# Patient Record
Sex: FEMALE | Race: BLACK OR AFRICAN AMERICAN | URBAN - METROPOLITAN AREA
[De-identification: names, ages, dates, MRNs, and addresses within clinical notes are randomized per-mention and may not be internally consistent; named-entity substitution may affect disease eponyms.]

---

## 2020-06-29 ENCOUNTER — WEB ENCOUNTER (OUTPATIENT)
Dept: URBAN - METROPOLITAN AREA CLINIC 105 | Facility: CLINIC | Age: 35
End: 2020-06-29

## 2020-12-02 ENCOUNTER — OFFICE VISIT (OUTPATIENT)
Dept: URBAN - METROPOLITAN AREA CLINIC 105 | Facility: CLINIC | Age: 35
End: 2020-12-02
Payer: COMMERCIAL

## 2020-12-02 DIAGNOSIS — Z12.83 SKIN CANCER SCREENING: ICD-10-CM

## 2020-12-02 DIAGNOSIS — K50.90 CROHN'S DISEASE: ICD-10-CM

## 2020-12-02 DIAGNOSIS — R19.7 DIARRHEA: ICD-10-CM

## 2020-12-02 DIAGNOSIS — D50.9 IRON DEFICIENCY ANEMIA: ICD-10-CM

## 2020-12-02 PROCEDURE — 99214 OFFICE O/P EST MOD 30 MIN: CPT | Performed by: INTERNAL MEDICINE

## 2020-12-02 PROCEDURE — G8427 DOCREV CUR MEDS BY ELIG CLIN: HCPCS | Performed by: INTERNAL MEDICINE

## 2020-12-02 PROCEDURE — G8417 CALC BMI ABV UP PARAM F/U: HCPCS | Performed by: INTERNAL MEDICINE

## 2020-12-02 PROCEDURE — G8483 FLU IMM NO ADMIN DOC REA: HCPCS | Performed by: INTERNAL MEDICINE

## 2020-12-02 PROCEDURE — 1036F TOBACCO NON-USER: CPT | Performed by: INTERNAL MEDICINE

## 2020-12-02 PROCEDURE — G9903 PT SCRN TBCO ID AS NON USER: HCPCS | Performed by: INTERNAL MEDICINE

## 2020-12-02 RX ORDER — PREDNISONE 5 MG/1
1 TABLET TABLET ORAL ONCE A DAY
Status: ACTIVE | COMMUNITY

## 2020-12-02 RX ORDER — PREDNISONE 20 MG/1
2 TABLETS TABLET ORAL ONCE A DAY
Qty: 60 TABLET | Refills: 1 | OUTPATIENT
Start: 2020-12-02 | End: 2021-01-31

## 2020-12-02 RX ORDER — MERCAPTOPURINE 50 MG/1
2 TABS TABLET ORAL DAILY
Qty: 180 TABLET | Refills: 1 | OUTPATIENT
Start: 2020-12-02

## 2020-12-02 NOTE — HPI-TODAY'S VISIT:
The patient presents on follow-up for Crohn's disease.  On 12/20/19, the patient said that in November, she started reporting abdominal pain, nausea/vomiting, and bloody diarrhea. She went to the ER on 11/23. She was diagnosed with the flu and discharged with Zofran and a flu medication. Symptoms persisted. She presented to the ER again on 11/28 with the same symptoms in addition to increased leg/arm pain and decreased mobility. A flu test was not done at the first visit, so it was done this time. It turned out she did not have the flu. Fluid was found in her ear and she was given a diagnosis of an ear infection. A colonoscopy was done on 12/4/19 with Dr. Capps which noted Crohn's colitis.  She was discharged on prednisone 40 mg daily and told her it would decrease the swelling in her legs. She was in the ER again on 12/10-12/14. Her prednisone was increased to 60 mg daily. It was administered intravenously for the first 2 days of her visit and then orally the rest of her stay. She was also given pain medication and Zofran.  Dr. Stephens recommended starting Remicade which she refused as an inpatient.     On 1/15/20, she says she started on 6-MP 50 mg 2 pills QD on 1/2/20. She was on prednisone 20 mg QD and would taper down to 15 mg on Jan 19.  She now reported insomnia and didn't urinate as often. She had 1-2 formed BMs/day with no bleeding. Side pain improved. Nausea resolved. She was on oral iron 1 pill QD.  On 2/18/20, she said she was on her last day of prednisone 2.5 mg. She was taking 6-MP 50 mg 2 pills QD. Diarrhea resolved. Abdominal pain has mostly resolved. She stopped taking oral iron on the 15th.   On 4/1/20, she was doing well. She stopped prednisone at her last visit.  She continued on 6-mp 100 mg daily. She denied abdominal pain, diarrhea, or rectal bleeding.  Today, she says she d/c 6-MP in July at the direction of her PCP (Dr. Aleksandra Bellamy) after presenting for a missed menstrual cycle. She again saw Dr. Bellamy in follow-up and was told to stay off 6-MP. Around this time she was having left-sided pain. An x-ray and MRI were done. She says the imaging found fibroids "pushing up against her kidney," which the patient was told was the cause of her pain. On Dec 18 she will have a uterine ablation. Her other two doctors, Dr. Haley (surgeon treating her fibroids) and Dr. Pinzon (OBGYN, also recommended she stay off 6-MP).  However, given she has been off treatment for Crohn's disease since July 2020 she presents with 4-6 loose BMs/day. She also reports rectal bleeding with each BM. Left-sided pain has persisted.   Labs 4/1/20 - 6-, 6-MMPN 215. CMP normal except creatinine 1.38, GFR 57. CBC, iron/TIBC all normal. 2/3/20 - 6-, 6-MMPN 43762. CBC normal except platelets 486. CMP normal. 1/15/20 - 6-, 6-MMPN 73668.  12/11/19 - CMP normal except sodium 135, ALT 59. 12/10/19 - CBC normal except hgb 9.8, MCV 69.6, platelets 713. 12/4/19 - TPMP normal metabolizer. 12/2/19 - CBC normal except WBC 12.3, hgb 11, MCV 71.4, platelets 620. Sed rate 87, haptoglobin 399, INR 1.42, Iron 15, TIBC 214, iron sat 7%, CRP 38.80. Ferritin normal. 10/4/19 -Mg 1.7. CBC, alk phos all normal. CMP normal except creatinine 1.11.

## 2020-12-03 ENCOUNTER — TELEPHONE ENCOUNTER (OUTPATIENT)
Dept: URBAN - METROPOLITAN AREA CLINIC 92 | Facility: CLINIC | Age: 35
End: 2020-12-03

## 2020-12-03 LAB
A/G RATIO: 1.3
ALBUMIN: 4.4
ALKALINE PHOSPHATASE: 79
ALT (SGPT): 9
AST (SGOT): 16
BASO (ABSOLUTE): 0
BASOS: 1
BILIRUBIN, TOTAL: 0.3
BUN/CREATININE RATIO: 7
BUN: 9
CALCIUM: 9.4
CARBON DIOXIDE, TOTAL: 22
CHLORIDE: 100
CREATININE: 1.27
EGFR IF AFRICN AM: 63
EGFR IF NONAFRICN AM: 55
EOS (ABSOLUTE): 0.2
EOS: 3
FERRITIN, SERUM: 12
GLOBULIN, TOTAL: 3.4
GLUCOSE: 84
HEMATOCRIT: 37.7
HEMATOLOGY COMMENTS:: (no result)
HEMOGLOBIN: 12.2
IMMATURE CELLS: (no result)
IMMATURE GRANS (ABS): 0
IMMATURE GRANULOCYTES: 0
IRON BIND.CAP.(TIBC): 346
IRON SATURATION: 7
IRON: 25
LYMPHS (ABSOLUTE): 1.2
LYMPHS: 17
MCH: 24.5
MCHC: 32.4
MCV: 76
MONOCYTES(ABSOLUTE): 1.3
MONOCYTES: 19
NEUTROPHILS (ABSOLUTE): 4
NEUTROPHILS: 60
NRBC: (no result)
PLATELETS: 387
POTASSIUM: 4
PROTEIN, TOTAL: 7.8
RBC: 4.98
RDW: 14.1
SODIUM: 136
T4,FREE(DIRECT): 1
TSH: 1.54
UIBC: 321
WBC: 6.7

## 2020-12-03 RX ORDER — FERROUS GLUCONATE 324 MG
1 TABLET WITH WATER OR JUICE BETWEEN MEALS TABLET ORAL ONCE A DAY
Qty: 90 | Refills: 1 | OUTPATIENT
Start: 2020-12-03

## 2020-12-15 ENCOUNTER — OFFICE VISIT (OUTPATIENT)
Dept: URBAN - METROPOLITAN AREA CLINIC 105 | Facility: CLINIC | Age: 35
End: 2020-12-15
Payer: COMMERCIAL

## 2020-12-15 DIAGNOSIS — Z12.83 SKIN CANCER SCREENING: ICD-10-CM

## 2020-12-15 DIAGNOSIS — K50.90 CROHN'S DISEASE: ICD-10-CM

## 2020-12-15 DIAGNOSIS — D50.9 IRON DEFICIENCY ANEMIA: ICD-10-CM

## 2020-12-15 PROCEDURE — G8483 FLU IMM NO ADMIN DOC REA: HCPCS | Performed by: INTERNAL MEDICINE

## 2020-12-15 PROCEDURE — G8417 CALC BMI ABV UP PARAM F/U: HCPCS | Performed by: INTERNAL MEDICINE

## 2020-12-15 PROCEDURE — G9903 PT SCRN TBCO ID AS NON USER: HCPCS | Performed by: INTERNAL MEDICINE

## 2020-12-15 PROCEDURE — 1036F TOBACCO NON-USER: CPT | Performed by: INTERNAL MEDICINE

## 2020-12-15 PROCEDURE — G8427 DOCREV CUR MEDS BY ELIG CLIN: HCPCS | Performed by: INTERNAL MEDICINE

## 2020-12-15 PROCEDURE — 99213 OFFICE O/P EST LOW 20 MIN: CPT | Performed by: INTERNAL MEDICINE

## 2020-12-15 RX ORDER — PREDNISONE 20 MG/1
2 TABLETS TABLET ORAL ONCE A DAY
Qty: 60 TABLET | Refills: 1 | Status: ACTIVE | COMMUNITY
Start: 2020-12-02 | End: 2021-01-31

## 2020-12-15 RX ORDER — FERROUS GLUCONATE 324 MG
1 TABLET WITH WATER OR JUICE BETWEEN MEALS TABLET ORAL ONCE A DAY
Qty: 90 | Refills: 1 | Status: ACTIVE | COMMUNITY
Start: 2020-12-03

## 2020-12-15 RX ORDER — PREDNISONE 5 MG/1
1 TABLET TABLET ORAL ONCE A DAY
Status: ACTIVE | COMMUNITY

## 2020-12-15 RX ORDER — MERCAPTOPURINE 50 MG/1
2 TABS TABLET ORAL DAILY
Qty: 180 TABLET | Refills: 1 | Status: ACTIVE | COMMUNITY
Start: 2020-12-02

## 2020-12-15 NOTE — HPI-TODAY'S VISIT:
The patient presents on follow-up for Crohn's disease.  On 12/20/19, the patient said that in November, she started reporting abdominal pain, nausea/vomiting, and bloody diarrhea. She went to the ER on 11/23. She was diagnosed with the flu and discharged with Zofran and a flu medication. Symptoms persisted. She presented to the ER again on 11/28 with the same symptoms in addition to increased leg/arm pain and decreased mobility. A flu test was not done at the first visit, so it was done this time. It turned out she did not have the flu. Fluid was found in her ear and she was given a diagnosis of an ear infection. A colonoscopy was done on 12/4/19 with Dr. Capps which noted Crohn's colitis.  She was discharged on prednisone 40 mg daily and told her it would decrease the swelling in her legs. She was in the ER again on 12/10-12/14. Her prednisone was increased to 60 mg daily. It was administered intravenously for the first 2 days of her visit and then orally the rest of her stay. She was also given pain medication and Zofran.  Dr. Stephens recommended starting Remicade which she refused as an inpatient.     On 1/15/20, she says she started on 6-MP 50 mg 2 pills QD on 1/2/20. She was on prednisone 20 mg QD and would taper down to 15 mg on Jan 19.  She now reported insomnia and didn't urinate as often. She had 1-2 formed BMs/day with no bleeding. Side pain improved. Nausea resolved. She was on oral iron 1 pill QD.  On 2/18/20, she said she was on her last day of prednisone 2.5 mg. She was taking 6-MP 50 mg 2 pills QD. Diarrhea resolved. Abdominal pain has mostly resolved. She stopped taking oral iron on the 15th.   On 4/1/20, she was doing well. She stopped prednisone at her last visit.  She continued on 6-mp 100 mg daily. She denied abdominal pain, diarrhea, or rectal bleeding.  On 12/2/20, she said she d/c 6-MP in July at the direction of her PCP (Dr. Aleksandra Bellamy) after presenting for a missed menstrual cycle. She again saw Dr. Bellamy in follow-up and was told to stay off 6-MP. Around this time she was having left-sided pain. An x-ray and MRI were done. She said the imaging found fibroids "pushing up against her kidney," which the patient was told was the cause of her pain. On Dec 18 she will have a uterine ablation. Her other two doctors, Dr. Haley (surgeon treating her fibroids) and Dr. Pinzon (OBGYN, also recommended she stay off 6-MP).  However, given she had been off treatment for Crohn's disease since July 2020 she presented with 4-6 loose BMs/day. She also reported rectal bleeding with each BM. Left-sided pain had persisted.  Today, she says she started on prednisone (decreased to 35 mg QD on Sunday) and resumed 6-MP. Frequency decreased to 1 BM/day with formation. Bleeding has decreased. She continues to report intermittent cramping before a BM. She has an appetite, but is afraid to liberalize her diet.  She started on iron 1 pill QD.  She is trying to establish with Dr. Ling at [facility].   Labs 12/2/20 - CBC normal except MCV 76. CMP normal creatinine 1.27. Iron 25, iron sat 7%, ferritin 12. TSH/FT4 normal.  4/1/20 - 6-, 6-MMPN 215. CMP normal except creatinine 1.38, GFR 57. CBC, iron/TIBC all normal. 2/3/20 - 6-, 6-MMPN 51198. CBC normal except platelets 486. CMP normal. 1/15/20 - 6-, 6-MMPN 98130.  12/11/19 - CMP normal except sodium 135, ALT 59. 12/10/19 - CBC normal except hgb 9.8, MCV 69.6, platelets 713. 12/4/19 - TPMP normal metabolizer. 12/2/19 - CBC normal except WBC 12.3, hgb 11, MCV 71.4, platelets 620. Sed rate 87, haptoglobin 399, INR 1.42, Iron 15, TIBC 214, iron sat 7%, CRP 38.80. Ferritin normal. 10/4/19 -Mg 1.7. CBC, alk phos all normal. CMP normal except creatinine 1.11.

## 2021-01-27 ENCOUNTER — OFFICE VISIT (OUTPATIENT)
Dept: URBAN - METROPOLITAN AREA CLINIC 105 | Facility: CLINIC | Age: 36
End: 2021-01-27
Payer: COMMERCIAL

## 2021-01-27 DIAGNOSIS — M54.5 LOWER BACK PAIN: ICD-10-CM

## 2021-01-27 DIAGNOSIS — D25.9 UTERINE LEIOMYOMA, UNSPECIFIED LOCATION: ICD-10-CM

## 2021-01-27 DIAGNOSIS — K50.90 CROHN'S DISEASE: ICD-10-CM

## 2021-01-27 DIAGNOSIS — D50.9 IRON DEFICIENCY ANEMIA: ICD-10-CM

## 2021-01-27 DIAGNOSIS — Z12.83 SKIN CANCER SCREENING: ICD-10-CM

## 2021-01-27 PROCEDURE — G8483 FLU IMM NO ADMIN DOC REA: HCPCS | Performed by: INTERNAL MEDICINE

## 2021-01-27 PROCEDURE — 99214 OFFICE O/P EST MOD 30 MIN: CPT | Performed by: INTERNAL MEDICINE

## 2021-01-27 PROCEDURE — G8417 CALC BMI ABV UP PARAM F/U: HCPCS | Performed by: INTERNAL MEDICINE

## 2021-01-27 PROCEDURE — G8427 DOCREV CUR MEDS BY ELIG CLIN: HCPCS | Performed by: INTERNAL MEDICINE

## 2021-01-27 PROCEDURE — G9903 PT SCRN TBCO ID AS NON USER: HCPCS | Performed by: INTERNAL MEDICINE

## 2021-01-27 RX ORDER — PREDNISONE 20 MG/1
2 TABLETS TABLET ORAL ONCE A DAY
Qty: 60 TABLET | Refills: 1 | Status: ON HOLD | COMMUNITY
Start: 2020-12-02 | End: 2021-01-31

## 2021-01-27 RX ORDER — FERROUS GLUCONATE 324 MG
1 TABLET WITH WATER OR JUICE BETWEEN MEALS TABLET ORAL ONCE A DAY
Qty: 90 | Refills: 1 | Status: ON HOLD | COMMUNITY
Start: 2020-12-03

## 2021-01-27 RX ORDER — PREDNISONE 5 MG/1
1 TABLET TABLET ORAL ONCE A DAY
Status: ON HOLD | COMMUNITY

## 2021-01-27 RX ORDER — MERCAPTOPURINE 50 MG/1
2 TABS TABLET ORAL DAILY
Qty: 180 TABLET | Refills: 1 | Status: ACTIVE | COMMUNITY
Start: 2020-12-02

## 2021-01-27 NOTE — HPI-TODAY'S VISIT:
The patient presents on follow-up for Crohn's disease.  On 12/20/19, the patient said that in November, she started reporting abdominal pain, nausea/vomiting, and bloody diarrhea. She went to the ER on 11/23. She was diagnosed with the flu and discharged with Zofran and a flu medication. Symptoms persisted. She presented to the ER again on 11/28 with the same symptoms in addition to increased leg/arm pain and decreased mobility. A flu test was not done at the first visit, so it was done this time. It turned out she did not have the flu. Fluid was found in her ear and she was given a diagnosis of an ear infection. A colonoscopy was done on 12/4/19 with Dr. Capps which noted Crohn's colitis.  She was discharged on prednisone 40 mg daily and told her it would decrease the swelling in her legs. She was in the ER again on 12/10-12/14. Her prednisone was increased to 60 mg daily. It was administered intravenously for the first 2 days of her visit and then orally the rest of her stay. She was also given pain medication and Zofran.  Dr. Stephens recommended starting Remicade which she refused as an inpatient.     On 1/15/20, she says she started on 6-MP 50 mg 2 pills QD on 1/2/20. She was on prednisone 20 mg QD and would taper down to 15 mg on Jan 19.  She now reported insomnia and didn't urinate as often. She had 1-2 formed BMs/day with no bleeding. Side pain improved. Nausea resolved. She was on oral iron 1 pill QD.  On 2/18/20, she said she was on her last day of prednisone 2.5 mg. She was taking 6-MP 50 mg 2 pills QD. Diarrhea resolved. Abdominal pain has mostly resolved. She stopped taking oral iron on the 15th.   On 4/1/20, she was doing well. She stopped prednisone at her last visit.  She continued on 6-mp 100 mg daily. She denied abdominal pain, diarrhea, or rectal bleeding.  On 12/2/20, she said she d/c 6-MP in July at the direction of her PCP (Dr. Aleksandra Bellamy) after presenting for a missed menstrual cycle. She again saw Dr. Bellamy in follow-up and was told to stay off 6-MP. Around this time she was having left-sided pain. An x-ray and MRI were done. She said the imaging found fibroids "pushing up against her kidney," which the patient was told was the cause of her pain. On Dec 18 she will have a uterine ablation. Her other two doctors, Dr. Haley (surgeon treating her fibroids) and Dr. Pinzon (OBGYN, also recommended she stay off 6-MP).  However, given she had been off treatment for Crohn's disease since July 2020 she presented with 4-6 loose BMs/day. She also reported rectal bleeding with each BM. Left-sided pain had persisted.  On 12/15/20, she said she started on prednisone (decreased to 35 mg QD on Sunday) and resumed 6-MP. Frequency decreased to 1 BM/day with formation. Bleeding had decreased. She continued to report intermittent cramping before a BM. She had an appetite, but was afraid to liberalize her diet.  She started on iron 1 pill QD.  Today, she says she tapered off prednisone on Sunday. She reports onset of bilateral lower back pain in the last week in addition to fatigue. She continues on 6-MP 50 mg 2 pills QD. She is off iron. She has liberalized her diet more since last visit, but is still on a low-fiber diet.  Labs 12/2/20 - CBC normal except MCV 76. CMP normal creatinine 1.27. Iron 25, iron sat 7%, ferritin 12. TSH/FT4 normal.  4/1/20 - 6-, 6-MMPN 215. CMP normal except creatinine 1.38, GFR 57. CBC, iron/TIBC all normal. 2/3/20 - 6-, 6-MMPN 71623. CBC normal except platelets 486. CMP normal. 1/15/20 - 6-, 6-MMPN 48992.  12/11/19 - CMP normal except sodium 135, ALT 59. 12/10/19 - CBC normal except hgb 9.8, MCV 69.6, platelets 713. 12/4/19 - TPMP normal metabolizer. 12/2/19 - CBC normal except WBC 12.3, hgb 11, MCV 71.4, platelets 620. Sed rate 87, haptoglobin 399, INR 1.42, Iron 15, TIBC 214, iron sat 7%, CRP 38.80. Ferritin normal. 10/4/19 -Mg 1.7. CBC, alk phos all normal. CMP normal except creatinine 1.11.

## 2021-02-02 ENCOUNTER — TELEPHONE ENCOUNTER (OUTPATIENT)
Dept: URBAN - METROPOLITAN AREA CLINIC 92 | Facility: CLINIC | Age: 36
End: 2021-02-02

## 2021-02-02 PROBLEM — 34000006: Status: ACTIVE | Noted: 2021-02-02

## 2021-02-02 LAB
6-MMPN: (no result)
6-TGN: 408
A/G RATIO: 1.8
ALBUMIN: 4.3
ALKALINE PHOSPHATASE: 101
ALT (SGPT): 118
APPEARANCE: CLEAR
AST (SGOT): 43
BASO (ABSOLUTE): 0
BASOS: 0
BILIRUBIN, TOTAL: 0.6
BILIRUBIN: NEGATIVE
BUN/CREATININE RATIO: 7
BUN: 6
CALCIUM: 8.9
CARBON DIOXIDE, TOTAL: 21
CHLORIDE: 105
CREATININE: 0.82
EGFR IF AFRICN AM: 107
EGFR IF NONAFRICN AM: 93
EOS (ABSOLUTE): 0.1
EOS: 4
FERRITIN, SERUM: 53
GLOBULIN, TOTAL: 2.4
GLUCOSE: 94
GLUCOSE: NEGATIVE
HEMATOCRIT: 29.8
HEMATOLOGY COMMENTS:: (no result)
HEMOGLOBIN: 10
IMMATURE CELLS: (no result)
IMMATURE GRANS (ABS): 0
IMMATURE GRANULOCYTES: 0
IRON BIND.CAP.(TIBC): 258
IRON SATURATION: 36
IRON: 93
KETONES: NEGATIVE
LYMPHS (ABSOLUTE): 0.8
LYMPHS: 32
Lab: (no result)
MCH: 28.3
MCHC: 33.6
MCV: 84
MICROSCOPIC EXAMINATION: (no result)
MONOCYTES(ABSOLUTE): 0.1
MONOCYTES: 5
NEUTROPHILS (ABSOLUTE): 1.5
NEUTROPHILS: 59
NITRITE, URINE: NEGATIVE
NRBC: (no result)
OCCULT BLOOD: NEGATIVE
PH: 6.5
PLATELETS: 268
POTASSIUM: 3.8
PROTEIN, TOTAL: 6.7
PROTEIN: NEGATIVE
RBC: 3.53
RDW: 22.6
SODIUM: 138
SPECIFIC GRAVITY: 1.01
UIBC: 165
URINE CULTURE, ROUTINE: (no result)
URINE-COLOR: YELLOW
UROBILINOGEN,SEMI-QN: 0.2
WBC ESTERASE: NEGATIVE
WBC: 2.6

## 2021-04-28 ENCOUNTER — OFFICE VISIT (OUTPATIENT)
Dept: URBAN - METROPOLITAN AREA CLINIC 105 | Facility: CLINIC | Age: 36
End: 2021-04-28
Payer: COMMERCIAL

## 2021-04-28 VITALS
HEIGHT: 71 IN | HEART RATE: 77 BPM | DIASTOLIC BLOOD PRESSURE: 71 MMHG | BODY MASS INDEX: 27.44 KG/M2 | WEIGHT: 196 LBS | SYSTOLIC BLOOD PRESSURE: 111 MMHG | TEMPERATURE: 97.9 F

## 2021-04-28 DIAGNOSIS — Z12.83 SKIN CANCER SCREENING: ICD-10-CM

## 2021-04-28 DIAGNOSIS — K50.90 CROHN'S DISEASE: ICD-10-CM

## 2021-04-28 DIAGNOSIS — D64.9 NORMOCYTIC ANEMIA: ICD-10-CM

## 2021-04-28 DIAGNOSIS — D50.9 IRON DEFICIENCY ANEMIA: ICD-10-CM

## 2021-04-28 DIAGNOSIS — D25.9 UTERINE LEIOMYOMA, UNSPECIFIED LOCATION: ICD-10-CM

## 2021-04-28 DIAGNOSIS — M54.5 LOWER BACK PAIN: ICD-10-CM

## 2021-04-28 PROCEDURE — 99214 OFFICE O/P EST MOD 30 MIN: CPT | Performed by: INTERNAL MEDICINE

## 2021-04-28 RX ORDER — MERCAPTOPURINE 50 MG/1
2 TABS TABLET ORAL DAILY
Qty: 180 TABLET | Refills: 1 | Status: ACTIVE | COMMUNITY
Start: 2020-12-02

## 2021-04-28 RX ORDER — PREDNISONE 5 MG/1
1 TABLET TABLET ORAL ONCE A DAY
Status: ON HOLD | COMMUNITY

## 2021-04-28 RX ORDER — FERROUS GLUCONATE 324 MG
1 TABLET WITH WATER OR JUICE BETWEEN MEALS TABLET ORAL ONCE A DAY
Qty: 90 | Refills: 1 | Status: ON HOLD | COMMUNITY
Start: 2020-12-03

## 2021-04-28 NOTE — HPI-TODAY'S VISIT:
The patient presents on follow-up for Crohn's disease.  On 12/20/19, the patient said that in November, she started reporting abdominal pain, nausea/vomiting, and bloody diarrhea. She went to the ER on 11/23. She was diagnosed with the flu and discharged with Zofran and a flu medication. Symptoms persisted. She presented to the ER again on 11/28 with the same symptoms in addition to increased leg/arm pain and decreased mobility. A flu test was not done at the first visit, so it was done this time. It turned out she did not have the flu. Fluid was found in her ear and she was given a diagnosis of an ear infection. A colonoscopy was done on 12/4/19 with Dr. Capps which noted Crohn's colitis.  She was discharged on prednisone 40 mg daily and told her it would decrease the swelling in her legs. She was in the ER again on 12/10-12/14. Her prednisone was increased to 60 mg daily. It was administered intravenously for the first 2 days of her visit and then orally the rest of her stay. She was also given pain medication and Zofran.  Dr. Stephens recommended starting Remicade which she refused as an inpatient.     On 1/15/20, she says she started on 6-MP 50 mg 2 pills QD on 1/2/20. She was on prednisone 20 mg QD and would taper down to 15 mg on Jan 19.  She now reported insomnia and didn't urinate as often. She had 1-2 formed BMs/day with no bleeding. Side pain improved. Nausea resolved. She was on oral iron 1 pill QD.  On 2/18/20, she said she was on her last day of prednisone 2.5 mg. She was taking 6-MP 50 mg 2 pills QD. Diarrhea resolved. Abdominal pain has mostly resolved. She stopped taking oral iron on the 15th.   On 4/1/20, she was doing well. She stopped prednisone at her last visit.  She continued on 6-mp 100 mg daily. She denied abdominal pain, diarrhea, or rectal bleeding.  On 12/2/20, she said she d/c 6-MP in July at the direction of her PCP (Dr. Aleksandra Bellamy) after presenting for a missed menstrual cycle. She again saw Dr. Bellamy in follow-up and was told to stay off 6-MP. Around this time she was having left-sided pain. An x-ray and MRI were done. She said the imaging found fibroids "pushing up against her kidney," which the patient was told was the cause of her pain. On Dec 18 she will have a uterine ablation. Her other two doctors, Dr. Haley (surgeon treating her fibroids) and Dr. Pinzon (OBGYN, also recommended she stay off 6-MP).  However, given she had been off treatment for Crohn's disease since July 2020 she presented with 4-6 loose BMs/day. She also reported rectal bleeding with each BM. Left-sided pain had persisted.  On 12/15/20, she said she started on prednisone (decreased to 35 mg QD on Sunday) and resumed 6-MP. Frequency decreased to 1 BM/day with formation. Bleeding had decreased. She continued to report intermittent cramping before a BM. She had an appetite, but was afraid to liberalize her diet.  She started on iron 1 pill QD.  On 1/27/21, she said she tapered off prednisone on Sunday. She reported onset of bilateral lower back pain in the last week in addition to fatigue. She continued on 6-MP 50 mg 2 pills QD. She was off iron. She had liberalized her diet more since last visit, but was still on a low-fiber diet.  Today, she says she decreased to 6-MP 50 mg 1 pill QD. She is currently asymptomatic GI-wise. She had her uterine fibroid embolization on March 16 and had labs done 2 weeks prior to that. She had temporary constipation following her procedure. Lower back pain has resolved.   Labs 1/27/21 - 6-, 6-MMPN 27740. CBC normal except WBC 2.6, hgb 10. CMP normal except AST 43, . UA, urine culture all negative. Iron/TIBC, ferritin all normal. 12/2/20 - CBC normal except MCV 76. CMP normal creatinine 1.27. Iron 25, iron sat 7%, ferritin 12. TSH/FT4 normal.  4/1/20 - 6-, 6-MMPN 215. CMP normal except creatinine 1.38, GFR 57. CBC, iron/TIBC all normal. 2/3/20 - 6-, 6-MMPN 31529. CBC normal except platelets 486. CMP normal. 1/15/20 - 6-, 6-MMPN 41573.  12/11/19 - CMP normal except sodium 135, ALT 59. 12/10/19 - CBC normal except hgb 9.8, MCV 69.6, platelets 713. 12/4/19 - TPMP normal metabolizer. 12/2/19 - CBC normal except WBC 12.3, hgb 11, MCV 71.4, platelets 620. Sed rate 87, haptoglobin 399, INR 1.42, Iron 15, TIBC 214, iron sat 7%, CRP 38.80. Ferritin normal. 10/4/19 -Mg 1.7. CBC, alk phos all normal. CMP normal except creatinine 1.11.

## 2021-05-04 LAB
6-MMPN: 4912
6-TGN: 340
A/G RATIO: 1.7
ALBUMIN: 4.4
ALKALINE PHOSPHATASE: 74
ALPHA THAL REFLEX: (no result)
ALT (SGPT): 16
AST (SGOT): 20
BASO (ABSOLUTE): 0
BASOS: 1
BILIRUBIN, TOTAL: <0.2
BUN/CREATININE RATIO: 10
BUN: 9
CALCIUM: 9.1
CARBON DIOXIDE, TOTAL: 21
CHLORIDE: 105
CREATININE: 0.92
EGFR IF AFRICN AM: 93
EGFR IF NONAFRICN AM: 80
EOS (ABSOLUTE): 0.1
EOS: 2
FERRITIN, SERUM: 21
FOLATE (FOLIC ACID), SERUM: 10.9
GLOBULIN, TOTAL: 2.6
GLUCOSE: 87
HEMATOCRIT: 34.2
HEMATOLOGY COMMENTS:: (no result)
HEMOGLOBIN: 11.1
HGB A2: (no result)
HGB A2: 3
HGB A: (no result)
HGB A: 58.9
HGB C: 35.3
HGB F: (no result)
HGB F: 2.8
HGB S: (no result)
HGB S: 0
HGB VARIANT: 0
IMMATURE CELLS: (no result)
IMMATURE GRANS (ABS): 0
IMMATURE GRANULOCYTES: 0
INTERPRETATION:: (no result)
IRON BIND.CAP.(TIBC): 335
IRON SATURATION: 9
IRON: 30
LYMPHS (ABSOLUTE): 0.8
LYMPHS: 16
Lab: (no result)
Lab: 0
MCH: 28.5
MCHC: 32.5
MCV: 88
MONOCYTES(ABSOLUTE): 0.4
MONOCYTES: 7
NEUTROPHILS (ABSOLUTE): 3.9
NEUTROPHILS: 74
NRBC: (no result)
PLATELETS: 370
POTASSIUM: 4
PROTEIN, TOTAL: 7
RBC: 3.9
RDW: 14.2
SODIUM: 137
UIBC: 305
VITAMIN B12: 647
WBC: 5.3

## 2021-08-03 ENCOUNTER — OFFICE VISIT (OUTPATIENT)
Dept: URBAN - METROPOLITAN AREA CLINIC 105 | Facility: CLINIC | Age: 36
End: 2021-08-03
Payer: COMMERCIAL

## 2021-08-03 VITALS
WEIGHT: 208.6 LBS | SYSTOLIC BLOOD PRESSURE: 123 MMHG | HEIGHT: 71 IN | HEART RATE: 86 BPM | DIASTOLIC BLOOD PRESSURE: 85 MMHG | TEMPERATURE: 96.7 F | BODY MASS INDEX: 29.2 KG/M2

## 2021-08-03 DIAGNOSIS — Z12.83 SKIN CANCER SCREENING: ICD-10-CM

## 2021-08-03 DIAGNOSIS — Z14.8 HEMOGLOBIN C VARIANT CARRIER: ICD-10-CM

## 2021-08-03 DIAGNOSIS — D64.9 NORMOCYTIC ANEMIA: ICD-10-CM

## 2021-08-03 DIAGNOSIS — E55.9 VITAMIN D DEFICIENCY: ICD-10-CM

## 2021-08-03 DIAGNOSIS — D25.9 UTERINE LEIOMYOMA, UNSPECIFIED LOCATION: ICD-10-CM

## 2021-08-03 DIAGNOSIS — K50.90 CROHN'S DISEASE: ICD-10-CM

## 2021-08-03 DIAGNOSIS — D50.9 IRON DEFICIENCY ANEMIA: ICD-10-CM

## 2021-08-03 DIAGNOSIS — M54.5 LOWER BACK PAIN: ICD-10-CM

## 2021-08-03 PROCEDURE — 99214 OFFICE O/P EST MOD 30 MIN: CPT | Performed by: INTERNAL MEDICINE

## 2021-08-03 RX ORDER — FERROUS GLUCONATE 324 MG
1 TABLET WITH WATER OR JUICE BETWEEN MEALS TABLET ORAL ONCE A DAY
Qty: 90 | Refills: 1 | Status: ON HOLD | COMMUNITY
Start: 2020-12-03

## 2021-08-03 RX ORDER — CHOLECALCIFEROL (VITAMIN D3) 50 MCG
1 TABLET TABLET ORAL ONCE A DAY
Status: ACTIVE | COMMUNITY

## 2021-08-03 RX ORDER — PREDNISONE 5 MG/1
1 TABLET TABLET ORAL ONCE A DAY
Status: ON HOLD | COMMUNITY

## 2021-08-03 RX ORDER — MERCAPTOPURINE 50 MG/1
2 TABS TABLET ORAL DAILY
Qty: 180 TABLET | Refills: 1 | Status: ACTIVE | COMMUNITY
Start: 2020-12-02

## 2021-08-03 NOTE — HPI-TODAY'S VISIT:
The patient presents on follow-up for Crohn's disease.  On 12/20/19, the patient said that in November, she started reporting abdominal pain, nausea/vomiting, and bloody diarrhea. She went to the ER on 11/23. She was diagnosed with the flu and discharged with Zofran and a flu medication. Symptoms persisted. She presented to the ER again on 11/28 with the same symptoms in addition to increased leg/arm pain and decreased mobility. A flu test was not done at the first visit, so it was done this time. It turned out she did not have the flu. Fluid was found in her ear and she was given a diagnosis of an ear infection. A colonoscopy was done on 12/4/19 with Dr. Capps which noted Crohn's colitis.  She was discharged on prednisone 40 mg daily and told her it would decrease the swelling in her legs. She was in the ER again on 12/10-12/14. Her prednisone was increased to 60 mg daily. It was administered intravenously for the first 2 days of her visit and then orally the rest of her stay. She was also given pain medication and Zofran.  Dr. Stephens recommended starting Remicade which she refused as an inpatient.     On 1/15/20, she says she started on 6-MP 50 mg 2 pills QD on 1/2/20. She was on prednisone 20 mg QD and would taper down to 15 mg on Jan 19.  She now reported insomnia and didn't urinate as often. She had 1-2 formed BMs/day with no bleeding. Side pain improved. Nausea resolved. She was on oral iron 1 pill QD.  On 2/18/20, she said she was on her last day of prednisone 2.5 mg. She was taking 6-MP 50 mg 2 pills QD. Diarrhea resolved. Abdominal pain has mostly resolved. She stopped taking oral iron on the 15th.   On 4/1/20, she was doing well. She stopped prednisone at her last visit.  She continued on 6-mp 100 mg daily. She denied abdominal pain, diarrhea, or rectal bleeding.  On 12/2/20, she said she d/c 6-MP in July at the direction of her PCP (Dr. Aleksandra Bellamy) after presenting for a missed menstrual cycle. She again saw Dr. Bellamy in follow-up and was told to stay off 6-MP. Around this time she was having left-sided pain. An x-ray and MRI were done. She said the imaging found fibroids "pushing up against her kidney," which the patient was told was the cause of her pain. On Dec 18 she will have a uterine ablation. Her other two doctors, Dr. Haley (surgeon treating her fibroids) and Dr. Pinzon (OBGYN, also recommended she stay off 6-MP).  However, given she had been off treatment for Crohn's disease since July 2020 she presented with 4-6 loose BMs/day. She also reported rectal bleeding with each BM. Left-sided pain had persisted.  On 12/15/20, she said she started on prednisone (decreased to 35 mg QD on Sunday) and resumed 6-MP. Frequency decreased to 1 BM/day with formation. Bleeding had decreased. She continued to report intermittent cramping before a BM. She had an appetite, but was afraid to liberalize her diet.  She started on iron 1 pill QD.  On 1/27/21, she said she tapered off prednisone on Sunday. She reported onset of bilateral lower back pain in the last week in addition to fatigue. She continued on 6-MP 50 mg 2 pills QD. She was off iron. She had liberalized her diet more since last visit, but was still on a low-fiber diet.  On 4/28/21, she said she decreased to 6-MP 50 mg 1 pill QD. She was currently asymptomatic GI-wise. She had her uterine fibroid embolization on March 16 and had labs done 2 weeks prior to that. She had temporary constipation following her procedure. Lower back pain had resolved.  Today, she reports onset of left-sided abdominal pain and lower back discomfort 3 weeks ago. As a result, she sleeps on a pillow  on both hips which provides some benefit. Having a BM also lessens discomfort. She has not seen a rheumatologist. 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 She is trying meditation in the morning to relieve stress and counts down from 100 when stressed. She has not had a recurrence of diarrhea or rectal bleeding. She continues on 6-MP 50 mg 1 pill QD. She continues on oral iron which has decreased her BM frequency to QOD she states. She has started on vit D supplementation 1x/week since early June.    Labs 4/28/21 - 6-, 6-MMPN 4912. Iron 9, hgb electrophoresis - hgb C trait (heterozygous), CMP, vit B12/folate all normal. 1/27/21 - 6-, 6-MMPN 98732. CBC normal except WBC 2.6, hgb 10. CMP normal except AST 43, . UA, urine culture all negative. Iron/TIBC, ferritin all normal. 12/2/20 - CBC normal except MCV 76. CMP normal creatinine 1.27. Iron 25, iron sat 7%, ferritin 12. TSH/FT4 normal.  4/1/20 - 6-, 6-MMPN 215. CMP normal except creatinine 1.38, GFR 57. CBC, iron/TIBC all normal. 2/3/20 - 6-, 6-MMPN 06855. CBC normal except platelets 486. CMP normal. 1/15/20 - 6-, 6-MMPN 48417.  12/11/19 - CMP normal except sodium 135, ALT 59. 12/10/19 - CBC normal except hgb 9.8, MCV 69.6, platelets 713. 12/4/19 - TPMP normal metabolizer. 12/2/19 - CBC normal except WBC 12.3, hgb 11, MCV 71.4, platelets 620. Sed rate 87, haptoglobin 399, INR 1.42, Iron 15, TIBC 214, iron sat 7%, CRP 38.80. Ferritin normal. 10/4/19 -Mg 1.7. CBC, alk phos all normal. CMP normal except creatinine 1.11.

## 2021-09-14 ENCOUNTER — TELEPHONE ENCOUNTER (OUTPATIENT)
Dept: URBAN - METROPOLITAN AREA CLINIC 105 | Facility: CLINIC | Age: 36
End: 2021-09-14

## 2021-09-14 RX ORDER — MERCAPTOPURINE 50 MG/1
1 TAB TABLET ORAL DAILY
Qty: 90 | Refills: 1
Start: 2020-12-02

## 2021-11-02 ENCOUNTER — TELEPHONE ENCOUNTER (OUTPATIENT)
Dept: URBAN - METROPOLITAN AREA CLINIC 105 | Facility: CLINIC | Age: 36
End: 2021-11-02

## 2021-11-09 ENCOUNTER — OFFICE VISIT (OUTPATIENT)
Dept: URBAN - METROPOLITAN AREA CLINIC 105 | Facility: CLINIC | Age: 36
End: 2021-11-09
Payer: COMMERCIAL

## 2021-11-09 VITALS
WEIGHT: 207.4 LBS | BODY MASS INDEX: 29.03 KG/M2 | HEART RATE: 75 BPM | HEIGHT: 71 IN | SYSTOLIC BLOOD PRESSURE: 116 MMHG | DIASTOLIC BLOOD PRESSURE: 73 MMHG | TEMPERATURE: 97.6 F

## 2021-11-09 DIAGNOSIS — D25.9 UTERINE LEIOMYOMA, UNSPECIFIED LOCATION: ICD-10-CM

## 2021-11-09 DIAGNOSIS — D50.9 IRON DEFICIENCY ANEMIA: ICD-10-CM

## 2021-11-09 DIAGNOSIS — Z12.83 SKIN CANCER SCREENING: ICD-10-CM

## 2021-11-09 DIAGNOSIS — K50.80 CROHN'S COLITIS: ICD-10-CM

## 2021-11-09 PROCEDURE — 99214 OFFICE O/P EST MOD 30 MIN: CPT | Performed by: INTERNAL MEDICINE

## 2021-11-09 RX ORDER — MERCAPTOPURINE 50 MG/1
1 TAB TABLET ORAL DAILY
Qty: 90 | Refills: 1 | Status: ACTIVE | COMMUNITY
Start: 2020-12-02

## 2021-11-09 RX ORDER — FERROUS GLUCONATE 324 MG
1 TABLET WITH WATER OR JUICE BETWEEN MEALS TABLET ORAL ONCE A DAY
Qty: 90 | Refills: 1 | Status: ON HOLD | COMMUNITY
Start: 2020-12-03

## 2021-11-09 RX ORDER — PREDNISONE 5 MG/1
1 TABLET TABLET ORAL ONCE A DAY
Status: ON HOLD | COMMUNITY

## 2021-11-09 RX ORDER — CHROMIUM 200 MCG
1 TABLET TABLET ORAL ONCE A DAY
Status: ACTIVE | COMMUNITY

## 2021-11-09 NOTE — HPI-TODAY'S VISIT:
The patient presents on follow-up for Crohn's disease.  On 12/20/19, the patient said that in November, she started reporting abdominal pain, nausea/vomiting, and bloody diarrhea. She went to the ER on 11/23. She was diagnosed with the flu and discharged with Zofran and a flu medication. Symptoms persisted. She presented to the ER again on 11/28 with the same symptoms in addition to increased leg/arm pain and decreased mobility. A flu test was not done at the first visit, so it was done this time. It turned out she did not have the flu. Fluid was found in her ear and she was given a diagnosis of an ear infection. A colonoscopy was done on 12/4/19 with Dr. Capps which noted Crohn's colitis.  She was discharged on prednisone 40 mg daily and told her it would decrease the swelling in her legs. She was in the ER again on 12/10-12/14. Her prednisone was increased to 60 mg daily. It was administered intravenously for the first 2 days of her visit and then orally the rest of her stay. She was also given pain medication and Zofran.  Dr. Stephens recommended starting Remicade which she refused as an inpatient.     On 1/15/20, she says she started on 6-MP 50 mg 2 pills QD on 1/2/20. She was on prednisone 20 mg QD and would taper down to 15 mg on Jan 19.  She now reported insomnia and didn't urinate as often. She had 1-2 formed BMs/day with no bleeding. Side pain improved. Nausea resolved. She was on oral iron 1 pill QD.  On 2/18/20, she said she was on her last day of prednisone 2.5 mg. She was taking 6-MP 50 mg 2 pills QD. Diarrhea resolved. Abdominal pain has mostly resolved. She stopped taking oral iron on the 15th.   On 4/1/20, she was doing well. She stopped prednisone at her last visit.  She continued on 6-mp 100 mg daily. She denied abdominal pain, diarrhea, or rectal bleeding.  On 12/2/20, she said she d/c 6-MP in July at the direction of her PCP (Dr. Aleksandra Bellamy) after presenting for a missed menstrual cycle. She again saw Dr. Bellamy in follow-up and was told to stay off 6-MP. Around this time she was having left-sided pain. An x-ray and MRI were done. She said the imaging found fibroids "pushing up against her kidney," which the patient was told was the cause of her pain. On Dec 18 she will have a uterine ablation. Her other two doctors, Dr. Haley (surgeon treating her fibroids) and Dr. Pinzon (OBGYN, also recommended she stay off 6-MP).  However, given she had been off treatment for Crohn's disease since July 2020 she presented with 4-6 loose BMs/day. She also reported rectal bleeding with each BM. Left-sided pain had persisted.  On 12/15/20, she said she started on prednisone (decreased to 35 mg QD on Sunday) and resumed 6-MP. Frequency decreased to 1 BM/day with formation. Bleeding had decreased. She continued to report intermittent cramping before a BM. She had an appetite, but was afraid to liberalize her diet.  She started on iron 1 pill QD.  On 1/27/21, she said she tapered off prednisone on Sunday. She reported onset of bilateral lower back pain in the last week in addition to fatigue. She continued on 6-MP 50 mg 2 pills QD. She was off iron. She had liberalized her diet more since last visit, but was still on a low-fiber diet.  On 4/28/21, she said she decreased to 6-MP 50 mg 1 pill QD. She was currently asymptomatic GI-wise. She had her uterine fibroid embolization on March 16 and had labs done 2 weeks prior to that. She had temporary constipation following her procedure. Lower back pain had resolved.  On 8/3/21, she reported onset of left-sided abdominal pain and lower back discomfort 3 weeks ago. As a result, she slept on a pillow  on both hips which provided some benefit. Having a BM also lessened discomfort. She had not seen a rheumatologist. 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 She was trying meditation in the morning to relieve stress and count down from 100 when stressed. She had not had a recurrence of diarrhea or rectal bleeding. She continued on 6-MP 50 mg 1 pill QD. She continued on oral iron which had decreased her BM frequency to QOD she stated. She had started on vit D supplementation 1x/week since early June.  Today, she says she is 7-weeks pregnant. She continues on 6-MP 50 mg 1 pill QD. She has not had abdominal pain, rectal bleeding, or diarrhea. She has a BM/2-3 days that she attributes to her pregnancy - she drinks prune juice to help her bowel habit. She continues on oral iron. She decreases vit D dosage to 1000 IU (prenatal vitamins also have vit D). Next bloodwork is on Tuesday.    Labs 4/28/21 - 6-, 6-MMPN 4912. Iron 9, hgb electrophoresis - hgb C trait (heterozygous), CMP, vit B12/folate all normal. 1/27/21 - 6-, 6-MMPN 07191. CBC normal except WBC 2.6, hgb 10. CMP normal except AST 43, . UA, urine culture all negative. Iron/TIBC, ferritin all normal. 12/2/20 - CBC normal except MCV 76. CMP normal creatinine 1.27. Iron 25, iron sat 7%, ferritin 12. TSH/FT4 normal.  4/1/20 - 6-, 6-MMPN 215. CMP normal except creatinine 1.38, GFR 57. CBC, iron/TIBC all normal. 2/3/20 - 6-, 6-MMPN 35264. CBC normal except platelets 486. CMP normal. 1/15/20 - 6-, 6-MMPN 18971.  12/11/19 - CMP normal except sodium 135, ALT 59. 12/10/19 - CBC normal except hgb 9.8, MCV 69.6, platelets 713. 12/4/19 - TPMP normal metabolizer. 12/2/19 - CBC normal except WBC 12.3, hgb 11, MCV 71.4, platelets 620. Sed rate 87, haptoglobin 399, INR 1.42, Iron 15, TIBC 214, iron sat 7%, CRP 38.80. Ferritin normal. 10/4/19 -Mg 1.7. CBC, alk phos all normal. CMP normal except creatinine 1.11.

## 2022-02-15 ENCOUNTER — TELEPHONE ENCOUNTER (OUTPATIENT)
Dept: URBAN - METROPOLITAN AREA CLINIC 105 | Facility: CLINIC | Age: 37
End: 2022-02-15

## 2022-02-15 ENCOUNTER — OFFICE VISIT (OUTPATIENT)
Dept: URBAN - METROPOLITAN AREA CLINIC 105 | Facility: CLINIC | Age: 37
End: 2022-02-15

## 2022-02-23 ENCOUNTER — WEB ENCOUNTER (OUTPATIENT)
Dept: URBAN - METROPOLITAN AREA CLINIC 105 | Facility: CLINIC | Age: 37
End: 2022-02-23

## 2022-02-23 ENCOUNTER — OFFICE VISIT (OUTPATIENT)
Dept: URBAN - METROPOLITAN AREA CLINIC 105 | Facility: CLINIC | Age: 37
End: 2022-02-23
Payer: COMMERCIAL

## 2022-02-23 VITALS
WEIGHT: 211.8 LBS | BODY MASS INDEX: 29.65 KG/M2 | TEMPERATURE: 97.5 F | DIASTOLIC BLOOD PRESSURE: 70 MMHG | HEIGHT: 71 IN | SYSTOLIC BLOOD PRESSURE: 101 MMHG | HEART RATE: 88 BPM

## 2022-02-23 DIAGNOSIS — D50.9 IRON DEFICIENCY ANEMIA: ICD-10-CM

## 2022-02-23 DIAGNOSIS — K50.80 CROHN'S COLITIS: ICD-10-CM

## 2022-02-23 PROCEDURE — 99214 OFFICE O/P EST MOD 30 MIN: CPT | Performed by: INTERNAL MEDICINE

## 2022-02-23 RX ORDER — MERCAPTOPURINE 50 MG/1
1 TAB TABLET ORAL DAILY
Qty: 90 | Refills: 1 | Status: ACTIVE | COMMUNITY
Start: 2020-12-02

## 2022-02-23 RX ORDER — PREDNISONE 5 MG/1
1 TABLET TABLET ORAL ONCE A DAY
Status: ON HOLD | COMMUNITY

## 2022-02-23 RX ORDER — FERROUS GLUCONATE 324 MG
1 TABLET WITH WATER OR JUICE BETWEEN MEALS TABLET ORAL ONCE A DAY
Qty: 90 | Refills: 1 | Status: ON HOLD | COMMUNITY
Start: 2020-12-03

## 2022-02-23 RX ORDER — ASPIRIN 81 MG/1
1 TABLET TABLET, CHEWABLE ORAL ONCE A DAY
Status: ACTIVE | COMMUNITY

## 2022-02-23 RX ORDER — CHROMIUM 200 MCG
1 TABLET TABLET ORAL ONCE A DAY
Status: ACTIVE | COMMUNITY

## 2022-02-23 NOTE — HPI-TODAY'S VISIT:
The patient presents on follow-up for Crohn's disease.  PAST MEDICAL HISTORY:  On 12/20/19, the patient said that in November, she started reporting abdominal pain, nausea/vomiting, and bloody diarrhea. She went to the ER on 11/23. She was diagnosed with the flu and discharged with Zofran and a flu medication. Symptoms persisted. She presented to the ER again on 11/28 with the same symptoms in addition to increased leg/arm pain and decreased mobility. A flu test was not done at the first visit, so it was done this time. It turned out she did not have the flu. Fluid was found in her ear and she was given a diagnosis of an ear infection. A colonoscopy was done on 12/4/19 with Dr. Capps which noted Crohn's colitis.  She was discharged on prednisone 40 mg daily and told her it would decrease the swelling in her legs. She was in the ER again on 12/10-12/14. Her prednisone was increased to 60 mg daily. It was administered intravenously for the first 2 days of her visit and then orally the rest of her stay. She was also given pain medication and Zofran.  Dr. Stephens recommended starting Remicade which she refused as an inpatient.     On 1/15/20, she says she started on 6-MP 50 mg 2 pills QD on 1/2/20. She was on prednisone 20 mg QD and would taper down to 15 mg on Jan 19.  She now reported insomnia and didn't urinate as often. She had 1-2 formed BMs/day with no bleeding. Side pain improved. Nausea resolved. She was on oral iron 1 pill QD.  On 2/18/20, she said she was on her last day of prednisone 2.5 mg. She was taking 6-MP 50 mg 2 pills QD. Diarrhea resolved. Abdominal pain has mostly resolved. She stopped taking oral iron on the 15th.   On 4/1/20, she was doing well. She stopped prednisone at her last visit.  She continued on 6-mp 100 mg daily. She denied abdominal pain, diarrhea, or rectal bleeding.  On 12/2/20, she said she d/c 6-MP in July at the direction of her PCP (Dr. Aleksandra Bellamy) after presenting for a missed menstrual cycle. She again saw Dr. Bellamy in follow-up and was told to stay off 6-MP. Around this time she was having left-sided pain. An x-ray and MRI were done. She said the imaging found fibroids "pushing up against her kidney," which the patient was told was the cause of her pain. On Dec 18 she will have a uterine ablation. Her other two doctors, Dr. Haley (surgeon treating her fibroids) and Dr. Pinzon (OBGYN, also recommended she stay off 6-MP).  However, given she had been off treatment for Crohn's disease since July 2020 she presented with 4-6 loose BMs/day. She also reported rectal bleeding with each BM. Left-sided pain had persisted.  On 12/15/20, she said she started on prednisone (decreased to 35 mg QD on Sunday) and resumed 6-MP. Frequency decreased to 1 BM/day with formation. Bleeding had decreased. She continued to report intermittent cramping before a BM. She had an appetite, but was afraid to liberalize her diet.  She started on iron 1 pill QD.  On 1/27/21, she said she tapered off prednisone on Sunday. She reported onset of bilateral lower back pain in the last week in addition to fatigue. She continued on 6-MP 50 mg 2 pills QD. She was off iron. She had liberalized her diet more since last visit, but was still on a low-fiber diet.  On 4/28/21, she said she decreased to 6-MP 50 mg 1 pill QD. She was currently asymptomatic GI-wise. She had her uterine fibroid embolization on March 16 and had labs done 2 weeks prior to that. She had temporary constipation following her procedure. Lower back pain had resolved.  On 8/3/21, she reported onset of left-sided abdominal pain and lower back discomfort 3 weeks ago. As a result, she slept on a pillow  on both hips which provided some benefit. Having a BM also lessened discomfort. She had not seen a rheumatologist. 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 She was trying meditation in the morning to relieve stress and count down from 100 when stressed. She had not had a recurrence of diarrhea or rectal bleeding. She continued on 6-MP 50 mg 1 pill QD. She continued on oral iron which had decreased her BM frequency to QOD she stated. She had started on vit D supplementation 1x/week since early June.  On 11/9/21, she said she was 7-weeks pregnant. She continued on 6-MP 50 mg 1 pill QD. She had not had abdominal pain, rectal bleeding, or diarrhea. She had a BM/2-3 days that she attributed to her pregnancy - she drank prune juice to help her bowel habit. She continued on oral iron. She decreased vit D dosage to 1000 IU (prenatal vitamins also have vit D). Next bloodwork was on Tuesday.  HPI:  Today, she says she went to the ER on New Year's Charla due to left suprapubic pain - labs and MRI were done she states. She was told her pain was due to a combination of stress, a "growing uterus," and fibroids. She is currently 25 weeks pregnant. She is not having diarrhea or rectal bleeding currently. She has 1 formed BM QD. Last month she was more constipated - would go up to 2 days without a BM. She feels some abdominal tightness but attributes it to stress. She had labs done on Nov 16 with her OBGYN - iron normal. She last saw her OBGYN on Jan 11.    Labs 4/28/21 - 6-, 6-MMPN 4912. Iron 9, hgb electrophoresis - hgb C trait (heterozygous), CMP, vit B12/folate all normal. 1/27/21 - 6-, 6-MMPN 91509. CBC normal except WBC 2.6, hgb 10. CMP normal except AST 43, . UA, urine culture all negative. Iron/TIBC, ferritin all normal. 12/2/20 - CBC normal except MCV 76. CMP normal creatinine 1.27. Iron 25, iron sat 7%, ferritin 12. TSH/FT4 normal.  4/1/20 - 6-, 6-MMPN 215. CMP normal except creatinine 1.38, GFR 57. CBC, iron/TIBC all normal. 2/3/20 - 6-, 6-MMPN 66581. CBC normal except platelets 486. CMP normal. 1/15/20 - 6-, 6-MMPN 70483.  12/11/19 - CMP normal except sodium 135, ALT 59. 12/10/19 - CBC normal except hgb 9.8, MCV 69.6, platelets 713. 12/4/19 - TPMP normal metabolizer. 12/2/19 - CBC normal except WBC 12.3, hgb 11, MCV 71.4, platelets 620. Sed rate 87, haptoglobin 399, INR 1.42, Iron 15, TIBC 214, iron sat 7%, CRP 38.80. Ferritin normal. 10/4/19 -Mg 1.7. CBC, alk phos all normal. CMP normal except creatinine 1.11.

## 2022-03-02 LAB
6-MMPN: 2042
6-TGN: 120
A/G RATIO: 1.6
ALBUMIN: 3.9
ALKALINE PHOSPHATASE: 76
ALT (SGPT): 12
AST (SGOT): 20
BASO (ABSOLUTE): 0
BASOS: 0
BILIRUBIN, TOTAL: 0.3
BUN/CREATININE RATIO: 7
BUN: 5
CALCIUM: 8.9
CARBON DIOXIDE, TOTAL: 18
CHLORIDE: 104
CREATININE: 0.74
EGFR IF AFRICN AM: 121
EGFR IF NONAFRICN AM: 105
EOS (ABSOLUTE): 0.1
EOS: 1
FERRITIN, SERUM: 65
GLOBULIN, TOTAL: 2.5
GLUCOSE: 76
HEMATOCRIT: 36.2
HEMATOLOGY COMMENTS:: (no result)
HEMOGLOBIN: 11.9
IMMATURE CELLS: (no result)
IMMATURE GRANS (ABS): 0.1
IMMATURE GRANULOCYTES: 1
IRON BIND.CAP.(TIBC): 324
IRON SATURATION: 22
IRON: 70
LYMPHS (ABSOLUTE): 0.9
LYMPHS: 12
MCH: 28.3
MCHC: 32.9
MCV: 86
MONOCYTES(ABSOLUTE): 0.7
MONOCYTES: 10
NEUTROPHILS (ABSOLUTE): 5.3
NEUTROPHILS: 76
NRBC: (no result)
PLATELETS: 207
POTASSIUM: 3.6
PROTEIN, TOTAL: 6.4
RBC: 4.21
RDW: 14.5
SODIUM: 138
UIBC: 254
WBC: 7.1

## 2022-04-26 ENCOUNTER — OFFICE VISIT (OUTPATIENT)
Dept: URBAN - METROPOLITAN AREA CLINIC 105 | Facility: CLINIC | Age: 37
End: 2022-04-26
Payer: COMMERCIAL

## 2022-04-26 DIAGNOSIS — K50.80 CROHN'S COLITIS: ICD-10-CM

## 2022-04-26 DIAGNOSIS — D25.9 UTERINE LEIOMYOMA, UNSPECIFIED LOCATION: ICD-10-CM

## 2022-04-26 DIAGNOSIS — D50.8 OTHER IRON DEFICIENCY ANEMIA: ICD-10-CM

## 2022-04-26 DIAGNOSIS — M54.59 OTHER LOW BACK PAIN: ICD-10-CM

## 2022-04-26 PROCEDURE — 99213 OFFICE O/P EST LOW 20 MIN: CPT | Performed by: INTERNAL MEDICINE

## 2022-04-26 RX ORDER — ASPIRIN 81 MG/1
1 TABLET TABLET, CHEWABLE ORAL ONCE A DAY
Status: ACTIVE | COMMUNITY

## 2022-04-26 RX ORDER — CHROMIUM 200 MCG
1 TABLET TABLET ORAL ONCE A DAY
Status: ACTIVE | COMMUNITY

## 2022-04-26 RX ORDER — FERROUS GLUCONATE 324 MG
1 TABLET WITH WATER OR JUICE BETWEEN MEALS TABLET ORAL ONCE A DAY
Qty: 90 | Refills: 1 | Status: ON HOLD | COMMUNITY
Start: 2020-12-03

## 2022-04-26 RX ORDER — PREDNISONE 5 MG/1
1 TABLET TABLET ORAL ONCE A DAY
Status: ON HOLD | COMMUNITY

## 2022-04-26 RX ORDER — MERCAPTOPURINE 50 MG/1
1 TAB TABLET ORAL DAILY
Qty: 90 | Refills: 1 | Status: ACTIVE | COMMUNITY
Start: 2020-12-02

## 2022-04-26 NOTE — HPI-TODAY'S VISIT:
The patient presents on follow-up for Crohn's disease.  PAST MEDICAL HISTORY:  On 12/20/19, the patient said that in November, she started reporting abdominal pain, nausea/vomiting, and bloody diarrhea. She went to the ER on 11/23. She was diagnosed with the flu and discharged with Zofran and a flu medication. Symptoms persisted. She presented to the ER again on 11/28 with the same symptoms in addition to increased leg/arm pain and decreased mobility. A flu test was not done at the first visit, so it was done this time. It turned out she did not have the flu. Fluid was found in her ear and she was given a diagnosis of an ear infection. A colonoscopy was done on 12/4/19 with Dr. Capps which noted Crohn's colitis.  She was discharged on prednisone 40 mg daily and told her it would decrease the swelling in her legs. She was in the ER again on 12/10-12/14. Her prednisone was increased to 60 mg daily. It was administered intravenously for the first 2 days of her visit and then orally the rest of her stay. She was also given pain medication and Zofran.  Dr. Stephens recommended starting Remicade which she refused as an inpatient.     On 1/15/20, she says she started on 6-MP 50 mg 2 pills QD on 1/2/20. She was on prednisone 20 mg QD and would taper down to 15 mg on Jan 19.  She now reported insomnia and didn't urinate as often. She had 1-2 formed BMs/day with no bleeding. Side pain improved. Nausea resolved. She was on oral iron 1 pill QD.  On 2/18/20, she said she was on her last day of prednisone 2.5 mg. She was taking 6-MP 50 mg 2 pills QD. Diarrhea resolved. Abdominal pain has mostly resolved. She stopped taking oral iron on the 15th.   On 4/1/20, she was doing well. She stopped prednisone at her last visit.  She continued on 6-mp 100 mg daily. She denied abdominal pain, diarrhea, or rectal bleeding.  On 12/2/20, she said she d/c 6-MP in July at the direction of her PCP (Dr. Aleksandra Bellamy) after presenting for a missed menstrual cycle. She again saw Dr. Bellamy in follow-up and was told to stay off 6-MP. Around this time she was having left-sided pain. An x-ray and MRI were done. She said the imaging found fibroids "pushing up against her kidney," which the patient was told was the cause of her pain. On Dec 18 she will have a uterine ablation. Her other two doctors, Dr. Haley (surgeon treating her fibroids) and Dr. Pinzon (OBGYN, also recommended she stay off 6-MP).  However, given she had been off treatment for Crohn's disease since July 2020 she presented with 4-6 loose BMs/day. She also reported rectal bleeding with each BM. Left-sided pain had persisted.  On 12/15/20, she said she started on prednisone (decreased to 35 mg QD on Sunday) and resumed 6-MP. Frequency decreased to 1 BM/day with formation. Bleeding had decreased. She continued to report intermittent cramping before a BM. She had an appetite, but was afraid to liberalize her diet.  She started on iron 1 pill QD.  On 1/27/21, she said she tapered off prednisone on Sunday. She reported onset of bilateral lower back pain in the last week in addition to fatigue. She continued on 6-MP 50 mg 2 pills QD. She was off iron. She had liberalized her diet more since last visit, but was still on a low-fiber diet.  On 4/28/21, she said she decreased to 6-MP 50 mg 1 pill QD. She was currently asymptomatic GI-wise. She had her uterine fibroid embolization on March 16 and had labs done 2 weeks prior to that. She had temporary constipation following her procedure. Lower back pain had resolved.  On 8/3/21, she reported onset of left-sided abdominal pain and lower back discomfort 3 weeks ago. As a result, she slept on a pillow  on both hips which provided some benefit. Having a BM also lessened discomfort. She had not seen a rheumatologist. 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 She was trying meditation in the morning to relieve stress and count down from 100 when stressed. She had not had a recurrence of diarrhea or rectal bleeding. She continued on 6-MP 50 mg 1 pill QD. She continued on oral iron which had decreased her BM frequency to QOD she stated. She had started on vit D supplementation 1x/week since early June.  On 11/9/21, she said she was 7-weeks pregnant. She continued on 6-MP 50 mg 1 pill QD. She had not had abdominal pain, rectal bleeding, or diarrhea. She had a BM/2-3 days that she attributed to her pregnancy - she drank prune juice to help her bowel habit. She continued on oral iron. She decreased vit D dosage to 1000 IU (prenatal vitamins also have vit D). Next bloodwork was on Tuesday.  On 2/23/22, she said she went to the ER on New Year's Charla due to left suprapubic pain - labs and MRI were done she stated. She was told her pain was due to a combination of stress, a "growing uterus," and fibroids. She was currently 25 weeks pregnant. She was not having diarrhea or rectal bleeding currently. She had 1 formed BM QD. Last month she was more constipated - would go up to 2 days without a BM. She felt some abdominal tightness but attributed it to stress. She had labs done on Nov 16 with her OBGYN - iron normal. She last saw her OBGYN on Jan 11.  HPI: Today, she says she has 6-weeks to go for the pregnancy. She continues on 6-MP 50 mg 1 pill QD, iron, and vit D. Labs done in Feb - recheck on May 4.    Labs 2/23/22 - 6-, 6-MMPN 2042. CMP normal except BUN 5. TIBC 324, UIBC 254, iron 70, iron sat 22%, ferritin 65. CBC normal. 4/28/21 - 6-, 6-MMPN 4912. Iron 9, hgb electrophoresis - hgb C trait (heterozygous), CMP, vit B12/folate all normal. 1/27/21 - 6-, 6-MMPN 23432. CBC normal except WBC 2.6, hgb 10. CMP normal except AST 43, . UA, urine culture all negative. Iron/TIBC, ferritin all normal. 12/2/20 - CBC normal except MCV 76. CMP normal creatinine 1.27. Iron 25, iron sat 7%, ferritin 12. TSH/FT4 normal.  4/1/20 - 6-, 6-MMPN 215. CMP normal except creatinine 1.38, GFR 57. CBC, iron/TIBC all normal. 2/3/20 - 6-, 6-MMPN 25033. CBC normal except platelets 486. CMP normal. 1/15/20 - 6-, 6-MMPN 12275.  12/11/19 - CMP normal except sodium 135, ALT 59. 12/10/19 - CBC normal except hgb 9.8, MCV 69.6, platelets 713. 12/4/19 - TPMP normal metabolizer. 12/2/19 - CBC normal except WBC 12.3, hgb 11, MCV 71.4, platelets 620. Sed rate 87, haptoglobin 399, INR 1.42, Iron 15, TIBC 214, iron sat 7%, CRP 38.80. Ferritin normal. 10/4/19 -Mg 1.7. CBC, alk phos all normal. CMP normal except creatinine 1.11.

## 2022-05-02 ENCOUNTER — TELEPHONE ENCOUNTER (OUTPATIENT)
Dept: URBAN - METROPOLITAN AREA CLINIC 92 | Facility: CLINIC | Age: 37
End: 2022-05-02

## 2022-05-02 RX ORDER — MERCAPTOPURINE 50 MG/1
1 TAB TABLET ORAL DAILY
Qty: 90 | Refills: 1 | OUTPATIENT
Start: 2020-12-02

## 2022-08-03 ENCOUNTER — WEB ENCOUNTER (OUTPATIENT)
Dept: URBAN - METROPOLITAN AREA CLINIC 105 | Facility: CLINIC | Age: 37
End: 2022-08-03

## 2022-08-03 ENCOUNTER — OFFICE VISIT (OUTPATIENT)
Dept: URBAN - METROPOLITAN AREA CLINIC 105 | Facility: CLINIC | Age: 37
End: 2022-08-03
Payer: COMMERCIAL

## 2022-08-03 VITALS
SYSTOLIC BLOOD PRESSURE: 116 MMHG | DIASTOLIC BLOOD PRESSURE: 67 MMHG | TEMPERATURE: 97.9 F | WEIGHT: 202.6 LBS | BODY MASS INDEX: 29.01 KG/M2 | HEIGHT: 70 IN | HEART RATE: 82 BPM

## 2022-08-03 DIAGNOSIS — Z12.83 SKIN CANCER SCREENING: ICD-10-CM

## 2022-08-03 DIAGNOSIS — Z14.8 HEMOGLOBIN C VARIANT CARRIER: ICD-10-CM

## 2022-08-03 DIAGNOSIS — D25.9 UTERINE LEIOMYOMA, UNSPECIFIED LOCATION: ICD-10-CM

## 2022-08-03 DIAGNOSIS — D50.8 ACQUIRED IRON DEFICIENCY ANEMIA DUE TO DECREASED ABSORPTION: ICD-10-CM

## 2022-08-03 DIAGNOSIS — E55.9 VITAMIN D DEFICIENCY: ICD-10-CM

## 2022-08-03 DIAGNOSIS — K50.80 CROHN'S COLITIS: ICD-10-CM

## 2022-08-03 PROCEDURE — 99214 OFFICE O/P EST MOD 30 MIN: CPT | Performed by: INTERNAL MEDICINE

## 2022-08-03 RX ORDER — FERROUS GLUCONATE 324 MG
1 TABLET WITH WATER OR JUICE BETWEEN MEALS TABLET ORAL ONCE A DAY
Qty: 90 | Refills: 1 | Status: ON HOLD | COMMUNITY
Start: 2020-12-03

## 2022-08-03 RX ORDER — CHROMIUM 200 MCG
1 TABLET TABLET ORAL ONCE A DAY
Status: ACTIVE | COMMUNITY

## 2022-08-03 RX ORDER — MERCAPTOPURINE 50 MG/1
1 TAB TABLET ORAL DAILY
Qty: 90 | Refills: 1 | Status: ACTIVE | COMMUNITY
Start: 2020-12-02

## 2022-08-03 NOTE — HPI-TODAY'S VISIT:
The patient presents on follow-up for Crohn's disease.  PAST MEDICAL HISTORY:  On 12/20/19, the patient said that in November, she started reporting abdominal pain, nausea/vomiting, and bloody diarrhea. She went to the ER on 11/23. She was diagnosed with the flu and discharged with Zofran and a flu medication. Symptoms persisted. She presented to the ER again on 11/28 with the same symptoms in addition to increased leg/arm pain and decreased mobility. A flu test was not done at the first visit, so it was done this time. It turned out she did not have the flu. Fluid was found in her ear and she was given a diagnosis of an ear infection. A colonoscopy was done on 12/4/19 with Dr. Capps which noted Crohn's colitis.  She was discharged on prednisone 40 mg daily and told her it would decrease the swelling in her legs. She was in the ER again on 12/10-12/14. Her prednisone was increased to 60 mg daily. It was administered intravenously for the first 2 days of her visit and then orally the rest of her stay. She was also given pain medication and Zofran.  Dr. Stephens recommended starting Remicade which she refused as an inpatient.     On 1/15/20, she says she started on 6-MP 50 mg 2 pills QD on 1/2/20. She was on prednisone 20 mg QD and would taper down to 15 mg on Jan 19.  She now reported insomnia and didn't urinate as often. She had 1-2 formed BMs/day with no bleeding. Side pain improved. Nausea resolved. She was on oral iron 1 pill QD.  On 2/18/20, she said she was on her last day of prednisone 2.5 mg. She was taking 6-MP 50 mg 2 pills QD. Diarrhea resolved. Abdominal pain has mostly resolved. She stopped taking oral iron on the 15th.   On 4/1/20, she was doing well. She stopped prednisone at her last visit.  She continued on 6-mp 100 mg daily. She denied abdominal pain, diarrhea, or rectal bleeding.  On 12/2/20, she said she d/c 6-MP in July at the direction of her PCP (Dr. Aleksandra Bellamy) after presenting for a missed menstrual cycle. She again saw Dr. Bellamy in follow-up and was told to stay off 6-MP. Around this time she was having left-sided pain. An x-ray and MRI were done. She said the imaging found fibroids "pushing up against her kidney," which the patient was told was the cause of her pain. On Dec 18 she will have a uterine ablation. Her other two doctors, Dr. Haley (surgeon treating her fibroids) and Dr. Pinzon (OBGYN, also recommended she stay off 6-MP).  However, given she had been off treatment for Crohn's disease since July 2020 she presented with 4-6 loose BMs/day. She also reported rectal bleeding with each BM. Left-sided pain had persisted.  On 12/15/20, she said she started on prednisone (decreased to 35 mg QD on Sunday) and resumed 6-MP. Frequency decreased to 1 BM/day with formation. Bleeding had decreased. She continued to report intermittent cramping before a BM. She had an appetite, but was afraid to liberalize her diet.  She started on iron 1 pill QD.  On 1/27/21, she said she tapered off prednisone on Sunday. She reported onset of bilateral lower back pain in the last week in addition to fatigue. She continued on 6-MP 50 mg 2 pills QD. She was off iron. She had liberalized her diet more since last visit, but was still on a low-fiber diet.  On 4/28/21, she said she decreased to 6-MP 50 mg 1 pill QD. She was currently asymptomatic GI-wise. She had her uterine fibroid embolization on March 16 and had labs done 2 weeks prior to that. She had temporary constipation following her procedure. Lower back pain had resolved.  On 8/3/21, she reported onset of left-sided abdominal pain and lower back discomfort 3 weeks ago. As a result, she slept on a pillow  on both hips which provided some benefit. Having a BM also lessened discomfort. She had not seen a rheumatologist. 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 She was trying meditation in the morning to relieve stress and count down from 100 when stressed. She had not had a recurrence of diarrhea or rectal bleeding. She continued on 6-MP 50 mg 1 pill QD. She continued on oral iron which had decreased her BM frequency to QOD she stated. She had started on vit D supplementation 1x/week since early June.  On 11/9/21, she said she was 7-weeks pregnant. She continued on 6-MP 50 mg 1 pill QD. She had not had abdominal pain, rectal bleeding, or diarrhea. She had a BM/2-3 days that she attributed to her pregnancy - she drank prune juice to help her bowel habit. She continued on oral iron. She decreased vit D dosage to 1000 IU (prenatal vitamins also have vit D). Next bloodwork was on Tuesday.  On 2/23/22, she said she went to the ER on New Year's Charla due to left suprapubic pain - labs and MRI were done she stated. She was told her pain was due to a combination of stress, a "growing uterus," and fibroids. She was currently 25 weeks pregnant. She was not having diarrhea or rectal bleeding currently. She had 1 formed BM QD. Last month she was more constipated - would go up to 2 days without a BM. She felt some abdominal tightness but attributed it to stress. She had labs done on Nov 16 with her OBGYN - iron normal. She last saw her OBGYN on Jan 11.  On 4/26/22, she said she had 6-weeks to go for the pregnancy. She continued on 6-MP 50 mg 1 pill QD, iron, and vit D. Labs done in Feb - recheck on May 4.  HPI: Today, she says she delivered her son on July 2nd (no epidural, 8 lbs 7 oz). She is breastfeeding. She last took 6-MP on July 1st because she was instructed to d/c all meds after being discharged from the hospital (patient is unsure why). She does not have diarrhea, bleeding, or abdominal pain. She took iron during her pregnancy and continues on iron.    Labs 2/23/22 - 6-, 6-MMPN 2042. CMP normal except BUN 5. TIBC 324, UIBC 254, iron 70, iron sat 22%, ferritin 65. CBC normal. 4/28/21 - 6-, 6-MMPN 4912. Iron 9, hgb electrophoresis - hgb C trait (heterozygous), CMP, vit B12/folate all normal. 1/27/21 - 6-, 6-MMPN 35846. CBC normal except WBC 2.6, hgb 10. CMP normal except AST 43, . UA, urine culture all negative. Iron/TIBC, ferritin all normal. 12/2/20 - CBC normal except MCV 76. CMP normal creatinine 1.27. Iron 25, iron sat 7%, ferritin 12. TSH/FT4 normal.  4/1/20 - 6-, 6-MMPN 215. CMP normal except creatinine 1.38, GFR 57. CBC, iron/TIBC all normal. 2/3/20 - 6-, 6-MMPN 42209. CBC normal except platelets 486. CMP normal. 1/15/20 - 6-, 6-MMPN 16317.  12/11/19 - CMP normal except sodium 135, ALT 59. 12/10/19 - CBC normal except hgb 9.8, MCV 69.6, platelets 713. 12/4/19 - TPMP normal metabolizer. 12/2/19 - CBC normal except WBC 12.3, hgb 11, MCV 71.4, platelets 620. Sed rate 87, haptoglobin 399, INR 1.42, Iron 15, TIBC 214, iron sat 7%, CRP 38.80. Ferritin normal. 10/4/19 -Mg 1.7. CBC, alk phos all normal. CMP normal except creatinine 1.11.

## 2022-08-04 LAB
A/G RATIO: 1.6
ABSOLUTE BASOPHILS: 29
ABSOLUTE EOSINOPHILS: 101
ABSOLUTE LYMPHOCYTES: 989
ABSOLUTE MONOCYTES: 557
ABSOLUTE NEUTROPHILS: 3125
ALBUMIN: 4.1
ALKALINE PHOSPHATASE: 96
ALT (SGPT): 48
AST (SGOT): 47
BASOPHILS: 0.6
BILIRUBIN, TOTAL: 0.5
BUN/CREATININE RATIO: (no result)
BUN: 12
CALCIUM: 9.2
CARBON DIOXIDE, TOTAL: 27
CHLORIDE: 106
CREATININE: 0.94
EGFR: 80
EOSINOPHILS: 2.1
FERRITIN, SERUM: 247
GLOBULIN, TOTAL: 2.6
GLUCOSE: 82
HEMATOCRIT: 46
HEMOGLOBIN: 16
IRON BIND.CAP.(TIBC): 274
IRON SATURATION: 47
IRON: 128
LYMPHOCYTES: 20.6
MCH: 28.5
MCHC: 34.8
MCV: 82
MONOCYTES: 11.6
MPV: 10.2
NEUTROPHILS: 65.1
PLATELET COUNT: 201
POTASSIUM: 4
PROTEIN, TOTAL: 6.7
RDW: 12
RED BLOOD CELL COUNT: 5.61
SODIUM: 142
VITAMIN D,25-OH,TOTAL,IA: 32
WHITE BLOOD CELL COUNT: 4.8

## 2022-08-09 ENCOUNTER — WEB ENCOUNTER (OUTPATIENT)
Dept: URBAN - METROPOLITAN AREA CLINIC 105 | Facility: CLINIC | Age: 37
End: 2022-08-09

## 2022-10-11 ENCOUNTER — CLAIMS CREATED FROM THE CLAIM WINDOW (OUTPATIENT)
Dept: URBAN - METROPOLITAN AREA CLINIC 105 | Facility: CLINIC | Age: 37
End: 2022-10-11
Payer: COMMERCIAL

## 2022-10-11 ENCOUNTER — WEB ENCOUNTER (OUTPATIENT)
Dept: URBAN - METROPOLITAN AREA CLINIC 105 | Facility: CLINIC | Age: 37
End: 2022-10-11

## 2022-10-11 DIAGNOSIS — Z14.8 HEMOGLOBIN C VARIANT CARRIER: ICD-10-CM

## 2022-10-11 DIAGNOSIS — E55.9 VITAMIN D DEFICIENCY: ICD-10-CM

## 2022-10-11 DIAGNOSIS — D25.9 UTERINE LEIOMYOMA, UNSPECIFIED LOCATION: ICD-10-CM

## 2022-10-11 DIAGNOSIS — R19.7 DIARRHEA, UNSPECIFIED TYPE: ICD-10-CM

## 2022-10-11 DIAGNOSIS — K50.90 CROHN'S DISEASE: ICD-10-CM

## 2022-10-11 DIAGNOSIS — D50.9 IRON DEFICIENCY ANEMIA: ICD-10-CM

## 2022-10-11 DIAGNOSIS — R74.01 ELEVATED TRANSAMINASE LEVEL: ICD-10-CM

## 2022-10-11 DIAGNOSIS — M54.5 LOWER BACK PAIN: ICD-10-CM

## 2022-10-11 DIAGNOSIS — Z12.83 SKIN CANCER SCREENING: ICD-10-CM

## 2022-10-11 DIAGNOSIS — K50.80 CROHN'S COLITIS: ICD-10-CM

## 2022-10-11 PROCEDURE — 99214 OFFICE O/P EST MOD 30 MIN: CPT | Performed by: INTERNAL MEDICINE

## 2022-10-11 RX ORDER — MERCAPTOPURINE 50 MG/1
1 TAB TABLET ORAL DAILY
Qty: 90 | Refills: 1 | Status: ACTIVE | COMMUNITY
Start: 2020-12-02

## 2022-10-11 RX ORDER — CHROMIUM 200 MCG
1 TABLET TABLET ORAL ONCE A DAY
Status: ON HOLD | COMMUNITY

## 2022-10-11 RX ORDER — FERROUS GLUCONATE 324 MG
1 TABLET WITH WATER OR JUICE BETWEEN MEALS TABLET ORAL ONCE A DAY
Qty: 90 | Refills: 1 | Status: ON HOLD | COMMUNITY
Start: 2020-12-03

## 2022-10-11 NOTE — HPI-TODAY'S VISIT:
The patient presents on follow-up for Crohn's disease.  PAST MEDICAL HISTORY:  On 12/20/19, the patient said that in November, she started reporting abdominal pain, nausea/vomiting, and bloody diarrhea. She went to the ER on 11/23. She was diagnosed with the flu and discharged with Zofran and a flu medication. Symptoms persisted. She presented to the ER again on 11/28 with the same symptoms in addition to increased leg/arm pain and decreased mobility. A flu test was not done at the first visit, so it was done this time. It turned out she did not have the flu. Fluid was found in her ear and she was given a diagnosis of an ear infection. A colonoscopy was done on 12/4/19 with Dr. Capps which noted Crohn's colitis.  She was discharged on prednisone 40 mg daily and told her it would decrease the swelling in her legs. She was in the ER again on 12/10-12/14. Her prednisone was increased to 60 mg daily. It was administered intravenously for the first 2 days of her visit and then orally the rest of her stay. She was also given pain medication and Zofran.  Dr. Stephens recommended starting Remicade which she refused as an inpatient.     On 1/15/20, she says she started on 6-MP 50 mg 2 pills QD on 1/2/20. She was on prednisone 20 mg QD and would taper down to 15 mg on Jan 19.  She now reported insomnia and didn't urinate as often. She had 1-2 formed BMs/day with no bleeding. Side pain improved. Nausea resolved. She was on oral iron 1 pill QD.  On 2/18/20, she said she was on her last day of prednisone 2.5 mg. She was taking 6-MP 50 mg 2 pills QD. Diarrhea resolved. Abdominal pain has mostly resolved. She stopped taking oral iron on the 15th.   On 4/1/20, she was doing well. She stopped prednisone at her last visit.  She continued on 6-mp 100 mg daily. She denied abdominal pain, diarrhea, or rectal bleeding.  On 12/2/20, she said she d/c 6-MP in July at the direction of her PCP (Dr. Aleksandra Bellamy) after presenting for a missed menstrual cycle. She again saw Dr. Bellamy in follow-up and was told to stay off 6-MP. Around this time she was having left-sided pain. An x-ray and MRI were done. She said the imaging found fibroids "pushing up against her kidney," which the patient was told was the cause of her pain. On Dec 18 she will have a uterine ablation. Her other two doctors, Dr. Haley (surgeon treating her fibroids) and Dr. Pinzon (OBGYN, also recommended she stay off 6-MP).  However, given she had been off treatment for Crohn's disease since July 2020 she presented with 4-6 loose BMs/day. She also reported rectal bleeding with each BM. Left-sided pain had persisted.  On 12/15/20, she said she started on prednisone (decreased to 35 mg QD on Sunday) and resumed 6-MP. Frequency decreased to 1 BM/day with formation. Bleeding had decreased. She continued to report intermittent cramping before a BM. She had an appetite, but was afraid to liberalize her diet.  She started on iron 1 pill QD.  On 1/27/21, she said she tapered off prednisone on Sunday. She reported onset of bilateral lower back pain in the last week in addition to fatigue. She continued on 6-MP 50 mg 2 pills QD. She was off iron. She had liberalized her diet more since last visit, but was still on a low-fiber diet.  On 4/28/21, she said she decreased to 6-MP 50 mg 1 pill QD. She was currently asymptomatic GI-wise. She had her uterine fibroid embolization on March 16 and had labs done 2 weeks prior to that. She had temporary constipation following her procedure. Lower back pain had resolved.  On 8/3/21, she reported onset of left-sided abdominal pain and lower back discomfort 3 weeks ago. As a result, she slept on a pillow  on both hips which provided some benefit. Having a BM also lessened discomfort. She had not seen a rheumatologist. 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 She was trying meditation in the morning to relieve stress and count down from 100 when stressed. She had not had a recurrence of diarrhea or rectal bleeding. She continued on 6-MP 50 mg 1 pill QD. She continued on oral iron which had decreased her BM frequency to QOD she stated. She had started on vit D supplementation 1x/week since early June.  On 11/9/21, she said she was 7-weeks pregnant. She continued on 6-MP 50 mg 1 pill QD. She had not had abdominal pain, rectal bleeding, or diarrhea. She had a BM/2-3 days that she attributed to her pregnancy - she drank prune juice to help her bowel habit. She continued on oral iron. She decreased vit D dosage to 1000 IU (prenatal vitamins also have vit D). Next bloodwork was on Tuesday.  On 2/23/22, she said she went to the ER on New Year's Charla due to left suprapubic pain - labs and MRI were done she stated. She was told her pain was due to a combination of stress, a "growing uterus," and fibroids. She was currently 25 weeks pregnant. She was not having diarrhea or rectal bleeding currently. She had 1 formed BM QD. Last month she was more constipated - would go up to 2 days without a BM. She felt some abdominal tightness but attributed it to stress. She had labs done on Nov 16 with her OBGYN - iron normal. She last saw her OBGYN on Jan 11.  On 4/26/22, she said she had 6-weeks to go for the pregnancy. She continued on 6-MP 50 mg 1 pill QD, iron, and vit D. Labs done in Feb - recheck on May 4.  On 8/3/22, she said she delivered her son on July 2nd (no epidural, 8 lbs 7 oz). She was breastfeeding. She last took 6-MP on July 1st because she was instructed to d/c all meds after being discharged from the hospital (patient was unsure why). She did not have diarrhea, bleeding, or abdominal pain. She took iron during her pregnancy and continued on iron.  Recommended she resume 6-mp (dump breast milk after 4 hrs after taking 6-mp).    HPI: Today, she says she had a recent flare - bleeding returned 10 days ago, cramping, and looser stools. She has not had blood in her stool for the past 3 days. Abdominal pain is improving. She last had a BM yesterday which was soft - no longer watery. She resumed her 6-MP on Oct 3. She d/c iron following her last lab results in Aug. She is no longer on vit D. Her son is 14 weeks old (currently using breast milk stored when not taking 6-mp) - plans to stop breastfeeding at 1 year old.     Labs 8/3/22 - CBC normal except hgb 16. CMP normal except AST 47, ALT 48. Iron sat 47%, ferritin 247, TIBC 274, iron 128. Vit D normal. 2/23/22 - 6-, 6-MMPN 2042. CMP normal except BUN 5. TIBC 324, UIBC 254, iron 70, iron sat 22%, ferritin 65. CBC normal. 4/28/21 - 6-, 6-MMPN 4912. Iron 9, hgb electrophoresis - hgb C trait (heterozygous), CMP, vit B12/folate all normal. 1/27/21 - 6-, 6-MMPN 71464. CBC normal except WBC 2.6, hgb 10. CMP normal except AST 43, . UA, urine culture all negative. Iron/TIBC, ferritin all normal. 12/2/20 - CBC normal except MCV 76. CMP normal creatinine 1.27. Iron 25, iron sat 7%, ferritin 12. TSH/FT4 normal.  4/1/20 - 6-, 6-MMPN 215. CMP normal except creatinine 1.38, GFR 57. CBC, iron/TIBC all normal. 2/3/20 - 6-, 6-MMPN 45150. CBC normal except platelets 486. CMP normal. 1/15/20 - 6-, 6-MMPN 33373.  12/11/19 - CMP normal except sodium 135, ALT 59. 12/10/19 - CBC normal except hgb 9.8, MCV 69.6, platelets 713. 12/4/19 - TPMP normal metabolizer. 12/2/19 - CBC normal except WBC 12.3, hgb 11, MCV 71.4, platelets 620. Sed rate 87, haptoglobin 399, INR 1.42, Iron 15, TIBC 214, iron sat 7%, CRP 38.80. Ferritin normal. 10/4/19 -Mg 1.7. CBC, alk phos all normal. CMP normal except creatinine 1.11.

## 2022-10-15 LAB
6 MMP: 2021
6 TG: 131
A/G RATIO: 1.3
ABSOLUTE BASOPHILS: 38
ABSOLUTE EOSINOPHILS: 109
ABSOLUTE LYMPHOCYTES: 947
ABSOLUTE MONOCYTES: 838
ABSOLUTE NEUTROPHILS: 4467
ALBUMIN: 4.1
ALKALINE PHOSPHATASE: 82
ALT (SGPT): 35
AST (SGOT): 29
BASOPHILS: 0.6
BILIRUBIN, TOTAL: 0.7
BUN/CREATININE RATIO: 7
BUN: 8
CALCIUM: 9.4
CARBON DIOXIDE, TOTAL: 28
CHLORIDE: 102
CREATININE: 1.07
EGFR: 69
EOSINOPHILS: 1.7
GLOBULIN, TOTAL: 3.2
GLUCOSE: 80
HEMATOCRIT: 39.4
HEMOGLOBIN: 13.4
LYMPHOCYTES: 14.8
MCH: 27.3
MCHC: 34
MCV: 80.2
MONOCYTES: 13.1
MPV: 10.2
NEUTROPHILS: 69.8
PLATELET COUNT: 294
POTASSIUM: 4.4
PROTEIN, TOTAL: 7.3
RDW: 11.8
RED BLOOD CELL COUNT: 4.91
SODIUM: 139
VITAMIN D,25-OH,TOTAL,IA: 37
WHITE BLOOD CELL COUNT: 6.4

## 2022-11-09 ENCOUNTER — WEB ENCOUNTER (OUTPATIENT)
Dept: URBAN - METROPOLITAN AREA CLINIC 124 | Facility: CLINIC | Age: 37
End: 2022-11-09

## 2022-11-09 RX ORDER — MERCAPTOPURINE 50 MG/1
1 TAB TABLET ORAL DAILY
Qty: 90 TABLET | Refills: 1 | OUTPATIENT
Start: 2020-12-02

## 2022-11-16 ENCOUNTER — TELEPHONE ENCOUNTER (OUTPATIENT)
Dept: URBAN - METROPOLITAN AREA CLINIC 92 | Facility: CLINIC | Age: 37
End: 2022-11-16

## 2022-11-16 RX ORDER — MERCAPTOPURINE 50 MG/1
1 TAB TABLET ORAL DAILY
Qty: 90 | Refills: 1 | OUTPATIENT
Start: 2020-12-02

## 2022-11-29 ENCOUNTER — OFFICE VISIT (OUTPATIENT)
Dept: URBAN - METROPOLITAN AREA CLINIC 105 | Facility: CLINIC | Age: 37
End: 2022-11-29
Payer: COMMERCIAL

## 2022-11-29 VITALS
HEIGHT: 70 IN | BODY MASS INDEX: 26.97 KG/M2 | WEIGHT: 188.4 LBS | DIASTOLIC BLOOD PRESSURE: 83 MMHG | HEART RATE: 87 BPM | TEMPERATURE: 98.2 F | SYSTOLIC BLOOD PRESSURE: 118 MMHG

## 2022-11-29 DIAGNOSIS — D50.8 ACQUIRED IRON DEFICIENCY ANEMIA DUE TO DECREASED ABSORPTION: ICD-10-CM

## 2022-11-29 DIAGNOSIS — D25.9 UTERINE LEIOMYOMA, UNSPECIFIED LOCATION: ICD-10-CM

## 2022-11-29 DIAGNOSIS — K50.80 CROHN'S COLITIS: ICD-10-CM

## 2022-11-29 PROCEDURE — 99214 OFFICE O/P EST MOD 30 MIN: CPT | Performed by: INTERNAL MEDICINE

## 2022-11-29 RX ORDER — FERROUS GLUCONATE 324 MG
1 TABLET WITH WATER OR JUICE BETWEEN MEALS TABLET ORAL ONCE A DAY
Qty: 90 | Refills: 1 | Status: ON HOLD | COMMUNITY
Start: 2020-12-03

## 2022-11-29 RX ORDER — CHROMIUM 200 MCG
1 TABLET TABLET ORAL ONCE A DAY
Status: ON HOLD | COMMUNITY

## 2022-11-29 RX ORDER — MERCAPTOPURINE 50 MG/1
1 TAB TABLET ORAL DAILY
Qty: 90 | Refills: 1 | Status: ACTIVE | COMMUNITY
Start: 2020-12-02

## 2022-11-29 NOTE — HPI-TODAY'S VISIT:
The patient presents on follow-up for Crohn's disease.  PAST MEDICAL HISTORY:  On 12/20/19, the patient said that in November, she started reporting abdominal pain, nausea/vomiting, and bloody diarrhea. She went to the ER on 11/23. She was diagnosed with the flu and discharged with Zofran and a flu medication. Symptoms persisted. She presented to the ER again on 11/28 with the same symptoms in addition to increased leg/arm pain and decreased mobility. A flu test was not done at the first visit, so it was done this time. It turned out she did not have the flu. Fluid was found in her ear and she was given a diagnosis of an ear infection. A colonoscopy was done on 12/4/19 with Dr. Capps which noted Crohn's colitis.  She was discharged on prednisone 40 mg daily and told her it would decrease the swelling in her legs. She was in the ER again on 12/10-12/14. Her prednisone was increased to 60 mg daily. It was administered intravenously for the first 2 days of her visit and then orally the rest of her stay. She was also given pain medication and Zofran.  Dr. Stephens recommended starting Remicade which she refused as an inpatient.     On 1/15/20, she says she started on 6-MP 50 mg 2 pills QD on 1/2/20. She was on prednisone 20 mg QD and would taper down to 15 mg on Jan 19.  She now reported insomnia and didn't urinate as often. She had 1-2 formed BMs/day with no bleeding. Side pain improved. Nausea resolved. She was on oral iron 1 pill QD.  On 2/18/20, she said she was on her last day of prednisone 2.5 mg. She was taking 6-MP 50 mg 2 pills QD. Diarrhea resolved. Abdominal pain has mostly resolved. She stopped taking oral iron on the 15th.   On 4/1/20, she was doing well. She stopped prednisone at her last visit.  She continued on 6-mp 100 mg daily. She denied abdominal pain, diarrhea, or rectal bleeding.  On 12/2/20, she said she d/c 6-MP in July at the direction of her PCP (Dr. Aleksandra Bellamy) after presenting for a missed menstrual cycle. She again saw Dr. Bellamy in follow-up and was told to stay off 6-MP. Around this time she was having left-sided pain. An x-ray and MRI were done. She said the imaging found fibroids "pushing up against her kidney," which the patient was told was the cause of her pain. On Dec 18 she will have a uterine ablation. Her other two doctors, Dr. Haley (surgeon treating her fibroids) and Dr. Pinzon (OBGYN, also recommended she stay off 6-MP).  However, given she had been off treatment for Crohn's disease since July 2020 she presented with 4-6 loose BMs/day. She also reported rectal bleeding with each BM. Left-sided pain had persisted.  On 12/15/20, she said she started on prednisone (decreased to 35 mg QD on Sunday) and resumed 6-MP. Frequency decreased to 1 BM/day with formation. Bleeding had decreased. She continued to report intermittent cramping before a BM. She had an appetite, but was afraid to liberalize her diet.  She started on iron 1 pill QD.  On 1/27/21, she said she tapered off prednisone on Sunday. She reported onset of bilateral lower back pain in the last week in addition to fatigue. She continued on 6-MP 50 mg 2 pills QD. She was off iron. She had liberalized her diet more since last visit, but was still on a low-fiber diet.  On 4/28/21, she said she decreased to 6-MP 50 mg 1 pill QD. She was currently asymptomatic GI-wise. She had her uterine fibroid embolization on March 16 and had labs done 2 weeks prior to that. She had temporary constipation following her procedure. Lower back pain had resolved.  On 8/3/21, she reported onset of left-sided abdominal pain and lower back discomfort 3 weeks ago. As a result, she slept on a pillow  on both hips which provided some benefit. Having a BM also lessened discomfort. She had not seen a rheumatologist. 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 She was trying meditation in the morning to relieve stress and count down from 100 when stressed. She had not had a recurrence of diarrhea or rectal bleeding. She continued on 6-MP 50 mg 1 pill QD. She continued on oral iron which had decreased her BM frequency to QOD she stated. She had started on vit D supplementation 1x/week since early June.  On 11/9/21, she said she was 7-weeks pregnant. She continued on 6-MP 50 mg 1 pill QD. She had not had abdominal pain, rectal bleeding, or diarrhea. She had a BM/2-3 days that she attributed to her pregnancy - she drank prune juice to help her bowel habit. She continued on oral iron. She decreased vit D dosage to 1000 IU (prenatal vitamins also have vit D). Next bloodwork was on Tuesday.  On 2/23/22, she said she went to the ER on New Year's Charla due to left suprapubic pain - labs and MRI were done she stated. She was told her pain was due to a combination of stress, a "growing uterus," and fibroids. She was currently 25 weeks pregnant. She was not having diarrhea or rectal bleeding currently. She had 1 formed BM QD. Last month she was more constipated - would go up to 2 days without a BM. She felt some abdominal tightness but attributed it to stress. She had labs done on Nov 16 with her OBGYN - iron normal. She last saw her OBGYN on Jan 11.  On 4/26/22, she said she had 6-weeks to go for the pregnancy. She continued on 6-MP 50 mg 1 pill QD, iron, and vit D. Labs done in Feb - recheck on May 4.  On 8/3/22, she said she delivered her son on July 2nd (no epidural, 8 lbs 7 oz). She was breastfeeding. She last took 6-MP on July 1st because she was instructed to d/c all meds after being discharged from the hospital (patient was unsure why). She did not have diarrhea, bleeding, or abdominal pain. She took iron during her pregnancy and continued on iron.  Recommended she resume 6-mp (dump breast milk after 4 hrs after taking 6-mp).    On 10/11/22, she said she had a recent flare - bleeding returned 10 days ago, cramping, and looser stools. She had not had blood in her stool for the past 3 days. Abdominal pain was improving. She last had a BM yesterday which was soft - no longer watery. She resumed her 6-MP on Oct 3. She d/c iron following her last lab results in Aug. She was no longer on vit D. Her son is 14 weeks old (currently using breast milk stored when not taking 6-mp) - planned to stop breastfeeding at 1 year old.  HPI: Today, she says she has 0-1 BMs QD that are formed. No rectal bleeding she states. No abdominal pain. She continues on 6-MP 50 mg 1 pill QD.    Labs 10/11/22 - 6-, 6-MMP 2021. Iron sat 47%, ferritin 247, iron 128%, TIBC 274. CMP normal except creatinine 1.07, ALT 35. CBC, Vit D all normal. 8/3/22 - CBC normal except hgb 16. CMP normal except AST 47, ALT 48. Iron sat 47%, ferritin 247, TIBC 274, iron 128. Vit D normal. 2/23/22 - 6-, 6-MMPN 2042. CMP normal except BUN 5. TIBC 324, UIBC 254, iron 70, iron sat 22%, ferritin 65. CBC normal. 4/28/21 - 6-, 6-MMPN 4912. Iron 9, hgb electrophoresis - hgb C trait (heterozygous), CMP, vit B12/folate all normal. 1/27/21 - 6-, 6-MMPN 79693. CBC normal except WBC 2.6, hgb 10. CMP normal except AST 43, . UA, urine culture all negative. Iron/TIBC, ferritin all normal. 12/2/20 - CBC normal except MCV 76. CMP normal creatinine 1.27. Iron 25, iron sat 7%, ferritin 12. TSH/FT4 normal.  4/1/20 - 6-, 6-MMPN 215. CMP normal except creatinine 1.38, GFR 57. CBC, iron/TIBC all normal. 2/3/20 - 6-, 6-MMPN 64371. CBC normal except platelets 486. CMP normal. 1/15/20 - 6-, 6-MMPN 50433.  12/11/19 - CMP normal except sodium 135, ALT 59. 12/10/19 - CBC normal except hgb 9.8, MCV 69.6, platelets 713. 12/4/19 - TPMP normal metabolizer. 12/2/19 - CBC normal except WBC 12.3, hgb 11, MCV 71.4, platelets 620. Sed rate 87, haptoglobin 399, INR 1.42, Iron 15, TIBC 214, iron sat 7%, CRP 38.80. Ferritin normal. 10/4/19 -Mg 1.7. CBC, alk phos all normal. CMP normal except creatinine 1.11.

## 2023-02-28 ENCOUNTER — OFFICE VISIT (OUTPATIENT)
Dept: URBAN - METROPOLITAN AREA CLINIC 105 | Facility: CLINIC | Age: 38
End: 2023-02-28
Payer: COMMERCIAL

## 2023-02-28 VITALS
SYSTOLIC BLOOD PRESSURE: 103 MMHG | BODY MASS INDEX: 25.74 KG/M2 | HEART RATE: 72 BPM | TEMPERATURE: 97.3 F | DIASTOLIC BLOOD PRESSURE: 71 MMHG | WEIGHT: 179.8 LBS | HEIGHT: 70 IN

## 2023-02-28 DIAGNOSIS — D50.9 IRON DEFICIENCY ANEMIA: ICD-10-CM

## 2023-02-28 DIAGNOSIS — Z14.8 HEMOGLOBIN C VARIANT CARRIER: ICD-10-CM

## 2023-02-28 DIAGNOSIS — K50.90 CROHN'S DISEASE: ICD-10-CM

## 2023-02-28 DIAGNOSIS — D25.9 UTERINE LEIOMYOMA, UNSPECIFIED LOCATION: ICD-10-CM

## 2023-02-28 DIAGNOSIS — E55.9 VITAMIN D DEFICIENCY: ICD-10-CM

## 2023-02-28 DIAGNOSIS — R74.01 ELEVATED TRANSAMINASE LEVEL: ICD-10-CM

## 2023-02-28 PROBLEM — 34713006: Status: ACTIVE | Noted: 2021-08-04

## 2023-02-28 PROCEDURE — 99214 OFFICE O/P EST MOD 30 MIN: CPT | Performed by: INTERNAL MEDICINE

## 2023-02-28 RX ORDER — CHROMIUM 200 MCG
1 TABLET TABLET ORAL ONCE A DAY
Status: ON HOLD | COMMUNITY

## 2023-02-28 RX ORDER — FERROUS GLUCONATE 324 MG
1 TABLET WITH WATER OR JUICE BETWEEN MEALS TABLET ORAL ONCE A DAY
Qty: 90 | Refills: 1 | Status: ON HOLD | COMMUNITY
Start: 2020-12-03

## 2023-02-28 RX ORDER — MERCAPTOPURINE 50 MG/1
1 TAB TABLET ORAL DAILY
Qty: 90 | Refills: 1 | Status: ACTIVE | COMMUNITY
Start: 2020-12-02

## 2023-02-28 NOTE — HPI-TODAY'S VISIT:
The patient presents on follow-up for Crohn's disease.  PAST MEDICAL HISTORY:  On 12/20/19, the patient said that in November, she started reporting abdominal pain, nausea/vomiting, and bloody diarrhea. She went to the ER on 11/23. She was diagnosed with the flu and discharged with Zofran and a flu medication. Symptoms persisted. She presented to the ER again on 11/28 with the same symptoms in addition to increased leg/arm pain and decreased mobility. A flu test was not done at the first visit, so it was done this time. It turned out she did not have the flu. Fluid was found in her ear and she was given a diagnosis of an ear infection. A colonoscopy was done on 12/4/19 with Dr. Capps which noted Crohn's colitis.  She was discharged on prednisone 40 mg daily and told her it would decrease the swelling in her legs. She was in the ER again on 12/10-12/14. Her prednisone was increased to 60 mg daily. It was administered intravenously for the first 2 days of her visit and then orally the rest of her stay. She was also given pain medication and Zofran.  Dr. Stephens recommended starting Remicade which she refused as an inpatient.     On 1/15/20, she says she started on 6-MP 50 mg 2 pills QD on 1/2/20. She was on prednisone 20 mg QD and would taper down to 15 mg on Jan 19.  She now reported insomnia and didn't urinate as often. She had 1-2 formed BMs/day with no bleeding. Side pain improved. Nausea resolved. She was on oral iron 1 pill QD.  On 2/18/20, she said she was on her last day of prednisone 2.5 mg. She was taking 6-MP 50 mg 2 pills QD. Diarrhea resolved. Abdominal pain has mostly resolved. She stopped taking oral iron on the 15th.   On 4/1/20, she was doing well. She stopped prednisone at her last visit.  She continued on 6-mp 100 mg daily. She denied abdominal pain, diarrhea, or rectal bleeding.  On 12/2/20, she said she d/c 6-MP in July at the direction of her PCP (Dr. Aleksandra Bellamy) after presenting for a missed menstrual cycle. She again saw Dr. Bellamy in follow-up and was told to stay off 6-MP. Around this time she was having left-sided pain. An x-ray and MRI were done. She said the imaging found fibroids "pushing up against her kidney," which the patient was told was the cause of her pain. On Dec 18 she will have a uterine ablation. Her other two doctors, Dr. Haley (surgeon treating her fibroids) and Dr. Pinzon (OBGYN, also recommended she stay off 6-MP).  However, given she had been off treatment for Crohn's disease since July 2020 she presented with 4-6 loose BMs/day. She also reported rectal bleeding with each BM. Left-sided pain had persisted.  On 12/15/20, she said she started on prednisone (decreased to 35 mg QD on Sunday) and resumed 6-MP. Frequency decreased to 1 BM/day with formation. Bleeding had decreased. She continued to report intermittent cramping before a BM. She had an appetite, but was afraid to liberalize her diet.  She started on iron 1 pill QD.  On 1/27/21, she said she tapered off prednisone on Sunday. She reported onset of bilateral lower back pain in the last week in addition to fatigue. She continued on 6-MP 50 mg 2 pills QD. She was off iron. She had liberalized her diet more since last visit, but was still on a low-fiber diet.  On 4/28/21, she said she decreased to 6-MP 50 mg 1 pill QD. She was currently asymptomatic GI-wise. She had her uterine fibroid embolization on March 16 and had labs done 2 weeks prior to that. She had temporary constipation following her procedure. Lower back pain had resolved.  On 8/3/21, she reported onset of left-sided abdominal pain and lower back discomfort 3 weeks ago. As a result, she slept on a pillow  on both hips which provided some benefit. Having a BM also lessened discomfort. She had not seen a rheumatologist. 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 She was trying meditation in the morning to relieve stress and count down from 100 when stressed. She had not had a recurrence of diarrhea or rectal bleeding. She continued on 6-MP 50 mg 1 pill QD. She continued on oral iron which had decreased her BM frequency to QOD she stated. She had started on vit D supplementation 1x/week since early June.  On 11/9/21, she said she was 7-weeks pregnant. She continued on 6-MP 50 mg 1 pill QD. She had not had abdominal pain, rectal bleeding, or diarrhea. She had a BM/2-3 days that she attributed to her pregnancy - she drank prune juice to help her bowel habit. She continued on oral iron. She decreased vit D dosage to 1000 IU (prenatal vitamins also have vit D). Next bloodwork was on Tuesday.  On 2/23/22, she said she went to the ER on New Year's Charla due to left suprapubic pain - labs and MRI were done she stated. She was told her pain was due to a combination of stress, a "growing uterus," and fibroids. She was currently 25 weeks pregnant. She was not having diarrhea or rectal bleeding currently. She had 1 formed BM QD. Last month she was more constipated - would go up to 2 days without a BM. She felt some abdominal tightness but attributed it to stress. She had labs done on Nov 16 with her OBGYN - iron normal. She last saw her OBGYN on Jan 11.  On 4/26/22, she said she had 6-weeks to go for the pregnancy. She continued on 6-MP 50 mg 1 pill QD, iron, and vit D. Labs done in Feb - recheck on May 4.  On 8/3/22, she said she delivered her son on July 2nd (no epidural, 8 lbs 7 oz). She was breastfeeding. She last took 6-MP on July 1st because she was instructed to d/c all meds after being discharged from the hospital (patient was unsure why). She did not have diarrhea, bleeding, or abdominal pain. She took iron during her pregnancy and continued on iron.  Recommended she resume 6-mp (dump breast milk after 4 hrs after taking 6-mp).    On 10/11/22, she said she had a recent flare - bleeding returned 10 days ago, cramping, and looser stools. She had not had blood in her stool for the past 3 days. Abdominal pain was improving. She last had a BM yesterday which was soft - no longer watery. She resumed her 6-MP on Oct 3. She d/c iron following her last lab results in Aug. She was no longer on vit D. Her son is 14 weeks old (currently using breast milk stored when not taking 6-mp) - planned to stop breastfeeding at 1 year old.  On 11/29/22, she said she had 0-1 BMs QD that were formed. No rectal bleeding she stated. No abdominal pain. She continued on 6-MP 50 mg 1 pill QD.  HPI: Today, she says she continues on 6-MP 50 mg 1 pill QD - no diarrhea, bleeding, or pain. She takes a prenatal vitamin (has iron in it) QOD.     Labs 10/11/22 - 6-, 6-MMP 2021. Iron sat 47%, ferritin 247, iron 128%, TIBC 274. CMP normal except creatinine 1.07, ALT 35. CBC, Vit D all normal. 8/3/22 - CBC normal except hgb 16. CMP normal except AST 47, ALT 48. Iron sat 47%, ferritin 247, TIBC 274, iron 128. Vit D normal. 2/23/22 - 6-, 6-MMPN 2042. CMP normal except BUN 5. TIBC 324, UIBC 254, iron 70, iron sat 22%, ferritin 65. CBC normal. 4/28/21 - 6-, 6-MMPN 4912. Iron 9, hgb electrophoresis - hgb C trait (heterozygous), CMP, vit B12/folate all normal. 1/27/21 - 6-, 6-MMPN 38039. CBC normal except WBC 2.6, hgb 10. CMP normal except AST 43, . UA, urine culture all negative. Iron/TIBC, ferritin all normal. 12/2/20 - CBC normal except MCV 76. CMP normal creatinine 1.27. Iron 25, iron sat 7%, ferritin 12. TSH/FT4 normal.  4/1/20 - 6-, 6-MMPN 215. CMP normal except creatinine 1.38, GFR 57. CBC, iron/TIBC all normal. 2/3/20 - 6-, 6-MMPN 02051. CBC normal except platelets 486. CMP normal. 1/15/20 - 6-, 6-MMPN 35581.  12/11/19 - CMP normal except sodium 135, ALT 59. 12/10/19 - CBC normal except hgb 9.8, MCV 69.6, platelets 713. 12/4/19 - TPMP normal metabolizer. 12/2/19 - CBC normal except WBC 12.3, hgb 11, MCV 71.4, platelets 620. Sed rate 87, haptoglobin 399, INR 1.42, Iron 15, TIBC 214, iron sat 7%, CRP 38.80. Ferritin normal. 10/4/19 -Mg 1.7. CBC, alk phos all normal. CMP normal except creatinine 1.11.

## 2023-06-13 ENCOUNTER — LAB OUTSIDE AN ENCOUNTER (OUTPATIENT)
Dept: URBAN - METROPOLITAN AREA CLINIC 105 | Facility: CLINIC | Age: 38
End: 2023-06-13

## 2023-06-13 ENCOUNTER — OFFICE VISIT (OUTPATIENT)
Dept: URBAN - METROPOLITAN AREA CLINIC 105 | Facility: CLINIC | Age: 38
End: 2023-06-13
Payer: COMMERCIAL

## 2023-06-13 VITALS
HEART RATE: 78 BPM | WEIGHT: 189.4 LBS | TEMPERATURE: 97 F | HEIGHT: 70 IN | DIASTOLIC BLOOD PRESSURE: 79 MMHG | BODY MASS INDEX: 27.11 KG/M2 | SYSTOLIC BLOOD PRESSURE: 116 MMHG

## 2023-06-13 DIAGNOSIS — E55.9 VITAMIN D DEFICIENCY: ICD-10-CM

## 2023-06-13 DIAGNOSIS — D50.9 IRON DEFICIENCY ANEMIA: ICD-10-CM

## 2023-06-13 DIAGNOSIS — K50.80 CROHN'S COLITIS: ICD-10-CM

## 2023-06-13 PROCEDURE — 99214 OFFICE O/P EST MOD 30 MIN: CPT | Performed by: INTERNAL MEDICINE

## 2023-06-13 RX ORDER — CHROMIUM 200 MCG
1 TABLET TABLET ORAL ONCE A DAY
Status: ON HOLD | COMMUNITY

## 2023-06-13 RX ORDER — FERROUS GLUCONATE 324 MG
1 TABLET WITH WATER OR JUICE BETWEEN MEALS TABLET ORAL ONCE A DAY
Qty: 90 | Refills: 1 | Status: ON HOLD | COMMUNITY
Start: 2020-12-03

## 2023-06-13 RX ORDER — MERCAPTOPURINE 50 MG/1
1 TAB TABLET ORAL DAILY
Qty: 90 | Refills: 1 | Status: ACTIVE | COMMUNITY
Start: 2020-12-02

## 2023-06-13 NOTE — HPI-TODAY'S VISIT:
The patient presents on follow-up for Crohn's disease.  PAST MEDICAL HISTORY:  On 12/20/19, the patient said that in November, she started reporting abdominal pain, nausea/vomiting, and bloody diarrhea. She went to the ER on 11/23. She was diagnosed with the flu and discharged with Zofran and a flu medication. Symptoms persisted. She presented to the ER again on 11/28 with the same symptoms in addition to increased leg/arm pain and decreased mobility. A flu test was not done at the first visit, so it was done this time. It turned out she did not have the flu. Fluid was found in her ear and she was given a diagnosis of an ear infection. A colonoscopy was done on 12/4/19 with Dr. Capps which noted Crohn's colitis.  She was discharged on prednisone 40 mg daily and told her it would decrease the swelling in her legs. She was in the ER again on 12/10-12/14. Her prednisone was increased to 60 mg daily. It was administered intravenously for the first 2 days of her visit and then orally the rest of her stay. She was also given pain medication and Zofran.  Dr. Stephens recommended starting Remicade which she refused as an inpatient.     On 1/15/20, she says she started on 6-MP 50 mg 2 pills QD on 1/2/20. She was on prednisone 20 mg QD and would taper down to 15 mg on Jan 19.  She now reported insomnia and didn't urinate as often. She had 1-2 formed BMs/day with no bleeding. Side pain improved. Nausea resolved. She was on oral iron 1 pill QD.  On 2/18/20, she said she was on her last day of prednisone 2.5 mg. She was taking 6-MP 50 mg 2 pills QD. Diarrhea resolved. Abdominal pain has mostly resolved. She stopped taking oral iron on the 15th.   On 4/1/20, she was doing well. She stopped prednisone at her last visit.  She continued on 6-mp 100 mg daily. She denied abdominal pain, diarrhea, or rectal bleeding.  On 12/2/20, she said she d/c 6-MP in July at the direction of her PCP (Dr. Aleksandra Bellamy) after presenting for a missed menstrual cycle. She again saw Dr. Bellamy in follow-up and was told to stay off 6-MP. Around this time she was having left-sided pain. An x-ray and MRI were done. She said the imaging found fibroids "pushing up against her kidney," which the patient was told was the cause of her pain. On Dec 18 she will have a uterine ablation. Her other two doctors, Dr. Haley (surgeon treating her fibroids) and Dr. Pinzon (OBGYN, also recommended she stay off 6-MP).  However, given she had been off treatment for Crohn's disease since July 2020 she presented with 4-6 loose BMs/day. She also reported rectal bleeding with each BM. Left-sided pain had persisted.  On 12/15/20, she said she started on prednisone (decreased to 35 mg QD on Sunday) and resumed 6-MP. Frequency decreased to 1 BM/day with formation. Bleeding had decreased. She continued to report intermittent cramping before a BM. She had an appetite, but was afraid to liberalize her diet.  She started on iron 1 pill QD.  On 1/27/21, she said she tapered off prednisone on Sunday. She reported onset of bilateral lower back pain in the last week in addition to fatigue. She continued on 6-MP 50 mg 2 pills QD. She was off iron. She had liberalized her diet more since last visit, but was still on a low-fiber diet.  On 4/28/21, she said she decreased to 6-MP 50 mg 1 pill QD. She was currently asymptomatic GI-wise. She had her uterine fibroid embolization on March 16 and had labs done 2 weeks prior to that. She had temporary constipation following her procedure. Lower back pain had resolved.  On 8/3/21, she reported onset of left-sided abdominal pain and lower back discomfort 3 weeks ago. As a result, she slept on a pillow  on both hips which provided some benefit. Having a BM also lessened discomfort. She had not seen a rheumatologist. 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 She was trying meditation in the morning to relieve stress and count down from 100 when stressed. She had not had a recurrence of diarrhea or rectal bleeding. She continued on 6-MP 50 mg 1 pill QD. She continued on oral iron which had decreased her BM frequency to QOD she stated. She had started on vit D supplementation 1x/week since early June.  On 11/9/21, she said she was 7-weeks pregnant. She continued on 6-MP 50 mg 1 pill QD. She had not had abdominal pain, rectal bleeding, or diarrhea. She had a BM/2-3 days that she attributed to her pregnancy - she drank prune juice to help her bowel habit. She continued on oral iron. She decreased vit D dosage to 1000 IU (prenatal vitamins also have vit D). Next bloodwork was on Tuesday.  On 2/23/22, she said she went to the ER on New Year's Charla due to left suprapubic pain - labs and MRI were done she stated. She was told her pain was due to a combination of stress, a "growing uterus," and fibroids. She was currently 25 weeks pregnant. She was not having diarrhea or rectal bleeding currently. She had 1 formed BM QD. Last month she was more constipated - would go up to 2 days without a BM. She felt some abdominal tightness but attributed it to stress. She had labs done on Nov 16 with her OBGYN - iron normal. She last saw her OBGYN on Jan 11.  On 4/26/22, she said she had 6-weeks to go for the pregnancy. She continued on 6-MP 50 mg 1 pill QD, iron, and vit D. Labs done in Feb - recheck on May 4.  On 8/3/22, she said she delivered her son on July 2nd (no epidural, 8 lbs 7 oz). She was breastfeeding. She last took 6-MP on July 1st because she was instructed to d/c all meds after being discharged from the hospital (patient was unsure why). She did not have diarrhea, bleeding, or abdominal pain. She took iron during her pregnancy and continued on iron.  Recommended she resume 6-mp (dump breast milk after 4 hrs after taking 6-mp).    On 10/11/22, she said she had a recent flare - bleeding returned 10 days ago, cramping, and looser stools. She had not had blood in her stool for the past 3 days. Abdominal pain was improving. She last had a BM yesterday which was soft - no longer watery. She resumed her 6-MP on Oct 3. She d/c iron following her last lab results in Aug. She was no longer on vit D. Her son is 14 weeks old (currently using breast milk stored when not taking 6-mp) - planned to stop breastfeeding at 1 year old.  On 11/29/22, she said she had 0-1 BMs QD that were formed. No rectal bleeding she stated. No abdominal pain. She continued on 6-MP 50 mg 1 pill QD.  On 2/28/23, she said she continued on 6-MP 50 mg 1 pill QD - no diarrhea, bleeding, or pain. She took a prenatal vitamin (had iron in it) QOD.  HPI: Today, the patient says she continues on 6-MP 50 mg 1 pill QD - no diarrhea, bleeding, or abdominal pain. Her PCP did not put her on vit D supplementation after her low vit D lab result. She is still on a prenatal vitamin.   Labs 4/6/23 - CBC normal (hgb 14, platelets 260) except MCV 78.8. Iron 116, , iron sat 36%, ferritin 183, vit D 24. CMP, TSH all normal.    10/11/22 - 6-, 6-MMP 2021. Iron sat 47%, ferritin 247, iron 128%, TIBC 274. CMP normal except creatinine 1.07, ALT 35. CBC, Vit D all normal. 8/3/22 - CBC normal except hgb 16. CMP normal except AST 47, ALT 48. Iron sat 47%, ferritin 247, TIBC 274, iron 128. Vit D normal. 2/23/22 - 6-, 6-MMPN 2042. CMP normal except BUN 5. TIBC 324, UIBC 254, iron 70, iron sat 22%, ferritin 65. CBC normal. 4/28/21 - 6-, 6-MMPN 4912. Iron 9, hgb electrophoresis - hgb C trait (heterozygous), CMP, vit B12/folate all normal. 1/27/21 - 6-, 6-MMPN 82152. CBC normal except WBC 2.6, hgb 10. CMP normal except AST 43, . UA, urine culture all negative. Iron/TIBC, ferritin all normal. 12/2/20 - CBC normal except MCV 76. CMP normal creatinine 1.27. Iron 25, iron sat 7%, ferritin 12. TSH/FT4 normal.  4/1/20 - 6-, 6-MMPN 215. CMP normal except creatinine 1.38, GFR 57. CBC, iron/TIBC all normal. 2/3/20 - 6-, 6-MMPN 88575. CBC normal except platelets 486. CMP normal. 1/15/20 - 6-, 6-MMPN 88655.  12/11/19 - CMP normal except sodium 135, ALT 59. 12/10/19 - CBC normal except hgb 9.8, MCV 69.6, platelets 713. 12/4/19 - TPMP normal metabolizer. 12/2/19 - CBC normal except WBC 12.3, hgb 11, MCV 71.4, platelets 620. Sed rate 87, haptoglobin 399, INR 1.42, Iron 15, TIBC 214, iron sat 7%, CRP 38.80. Ferritin normal. 10/4/19 -Mg 1.7. CBC, alk phos all normal. CMP normal except creatinine 1.11. Left arm;

## 2023-07-17 ENCOUNTER — TELEPHONE ENCOUNTER (OUTPATIENT)
Dept: URBAN - METROPOLITAN AREA CLINIC 105 | Facility: CLINIC | Age: 38
End: 2023-07-17

## 2023-07-17 ENCOUNTER — WEB ENCOUNTER (OUTPATIENT)
Dept: URBAN - METROPOLITAN AREA CLINIC 105 | Facility: CLINIC | Age: 38
End: 2023-07-17

## 2023-07-18 ENCOUNTER — OUT OF OFFICE VISIT (OUTPATIENT)
Dept: URBAN - METROPOLITAN AREA MEDICAL CENTER 33 | Facility: MEDICAL CENTER | Age: 38
End: 2023-07-18
Payer: COMMERCIAL

## 2023-07-18 DIAGNOSIS — E87.6 ACUTE HYPOKALEMIA: ICD-10-CM

## 2023-07-18 DIAGNOSIS — K50.918 ACUTE CROHN'S DISEASE WITH OTHER COMPLICATION: ICD-10-CM

## 2023-07-18 DIAGNOSIS — R19.7 ACUTE DIARRHEA: ICD-10-CM

## 2023-07-18 DIAGNOSIS — K92.1 ACUTE MELENA: ICD-10-CM

## 2023-07-18 PROCEDURE — 99284 EMERGENCY DEPT VISIT MOD MDM: CPT | Performed by: INTERNAL MEDICINE

## 2023-07-20 ENCOUNTER — TELEPHONE ENCOUNTER (OUTPATIENT)
Dept: URBAN - METROPOLITAN AREA CLINIC 105 | Facility: CLINIC | Age: 38
End: 2023-07-20

## 2023-07-20 RX ORDER — PREDNISONE 10 MG/1
4 TABLETS TABLET ORAL ONCE A DAY
Qty: 120 TABLET | Refills: 1 | OUTPATIENT
Start: 2023-07-20 | End: 2023-09-18

## 2023-07-24 ENCOUNTER — OFFICE VISIT (OUTPATIENT)
Dept: URBAN - METROPOLITAN AREA CLINIC 105 | Facility: CLINIC | Age: 38
End: 2023-07-24

## 2023-07-24 RX ORDER — MERCAPTOPURINE 50 MG/1
1 TAB TABLET ORAL DAILY
Qty: 90 | Refills: 1 | Status: ACTIVE | COMMUNITY
Start: 2020-12-02

## 2023-07-24 RX ORDER — FERROUS GLUCONATE 324 MG
1 TABLET WITH WATER OR JUICE BETWEEN MEALS TABLET ORAL ONCE A DAY
Qty: 90 | Refills: 1 | Status: ON HOLD | COMMUNITY
Start: 2020-12-03

## 2023-07-24 RX ORDER — PREDNISONE 10 MG/1
4 TABLETS TABLET ORAL ONCE A DAY
Qty: 120 TABLET | Refills: 1 | Status: ACTIVE | COMMUNITY
Start: 2023-07-20 | End: 2023-09-18

## 2023-07-24 RX ORDER — CHROMIUM 200 MCG
1 TABLET TABLET ORAL ONCE A DAY
Status: ON HOLD | COMMUNITY

## 2023-07-26 ENCOUNTER — WEB ENCOUNTER (OUTPATIENT)
Dept: URBAN - METROPOLITAN AREA CLINIC 105 | Facility: CLINIC | Age: 38
End: 2023-07-26

## 2023-07-30 ENCOUNTER — WEB ENCOUNTER (OUTPATIENT)
Dept: URBAN - METROPOLITAN AREA CLINIC 105 | Facility: CLINIC | Age: 38
End: 2023-07-30

## 2023-08-01 ENCOUNTER — TELEPHONE ENCOUNTER (OUTPATIENT)
Dept: URBAN - METROPOLITAN AREA CLINIC 105 | Facility: CLINIC | Age: 38
End: 2023-08-01

## 2023-08-09 ENCOUNTER — OFFICE VISIT (OUTPATIENT)
Dept: URBAN - METROPOLITAN AREA CLINIC 105 | Facility: CLINIC | Age: 38
End: 2023-08-09
Payer: COMMERCIAL

## 2023-08-09 VITALS
DIASTOLIC BLOOD PRESSURE: 67 MMHG | HEIGHT: 70 IN | SYSTOLIC BLOOD PRESSURE: 98 MMHG | HEART RATE: 103 BPM | TEMPERATURE: 97.9 F | WEIGHT: 153 LBS | BODY MASS INDEX: 21.9 KG/M2

## 2023-08-09 DIAGNOSIS — D50.9 IRON DEFICIENCY ANEMIA: ICD-10-CM

## 2023-08-09 DIAGNOSIS — K50.80 CROHN'S COLITIS: ICD-10-CM

## 2023-08-09 DIAGNOSIS — D25.9 UTERINE LEIOMYOMA, UNSPECIFIED LOCATION: ICD-10-CM

## 2023-08-09 PROCEDURE — 99214 OFFICE O/P EST MOD 30 MIN: CPT | Performed by: INTERNAL MEDICINE

## 2023-08-09 RX ORDER — FERROUS GLUCONATE 324 MG
1 TABLET WITH WATER OR JUICE BETWEEN MEALS TABLET ORAL ONCE A DAY
Qty: 90 | Refills: 1 | Status: ON HOLD | COMMUNITY
Start: 2020-12-03

## 2023-08-09 RX ORDER — PROMETHAZINE HYDROCHLORIDE 12.5 MG/1
1-2 TABLETS TABLET ORAL
Qty: 45 | Refills: 2 | OUTPATIENT
Start: 2023-08-09 | End: 2023-11-06

## 2023-08-09 RX ORDER — MERCAPTOPURINE 50 MG/1
1 TAB TABLET ORAL DAILY
Qty: 90 | Refills: 1 | Status: ACTIVE | COMMUNITY
Start: 2020-12-02

## 2023-08-09 RX ORDER — ONDANSETRON HYDROCHLORIDE 4 MG/1
1-2 TABLETS TABLET, FILM COATED ORAL EVERY 6 HOURS
Qty: 45 | Refills: 2 | OUTPATIENT
Start: 2023-08-09

## 2023-08-09 RX ORDER — PREDNISONE 10 MG/1
4 TABLETS TABLET ORAL ONCE A DAY
Qty: 120 TABLET | Refills: 1 | Status: ACTIVE | COMMUNITY
Start: 2023-07-20 | End: 2023-09-18

## 2023-08-09 RX ORDER — HYOSCYAMINE SULFATE 0.12 MG/1
1 TABLET TABLET SUBLINGUAL EVERY 6 HOURS
Qty: 30 | Refills: 2 | OUTPATIENT
Start: 2023-08-09 | End: 2023-11-06

## 2023-08-09 RX ORDER — CHROMIUM 200 MCG
1 TABLET TABLET ORAL ONCE A DAY
Status: ON HOLD | COMMUNITY

## 2023-08-09 RX ORDER — PREDNISONE 10 MG/1
4 TABLETS TABLET ORAL
Qty: 120 | Refills: 2 | OUTPATIENT
Start: 2023-08-09 | End: 2023-11-06

## 2023-08-09 NOTE — HPI-TODAY'S VISIT:
The patient presents on follow-up for Crohn's disease.  PAST MEDICAL HISTORY:  On 12/20/19, the patient said that in November, she started reporting abdominal pain, nausea/vomiting, and bloody diarrhea. She went to the ER on 11/23. She was diagnosed with the flu and discharged with Zofran and a flu medication. Symptoms persisted. She presented to the ER again on 11/28 with the same symptoms in addition to increased leg/arm pain and decreased mobility. A flu test was not done at the first visit, so it was done this time. It turned out she did not have the flu. Fluid was found in her ear and she was given a diagnosis of an ear infection. A colonoscopy was done on 12/4/19 with Dr. Capps which noted Crohn's colitis.  She was discharged on prednisone 40 mg daily and told her it would decrease the swelling in her legs. She was in the ER again on 12/10-12/14. Her prednisone was increased to 60 mg daily. It was administered intravenously for the first 2 days of her visit and then orally the rest of her stay. She was also given pain medication and Zofran.  Dr. Stephens recommended starting Remicade which she refused as an inpatient.     On 1/15/20, she says she started on 6-MP 50 mg 2 pills QD on 1/2/20. She was on prednisone 20 mg QD and would taper down to 15 mg on Jan 19.  She now reported insomnia and didn't urinate as often. She had 1-2 formed BMs/day with no bleeding. Side pain improved. Nausea resolved. She was on oral iron 1 pill QD.  On 2/18/20, she said she was on her last day of prednisone 2.5 mg. She was taking 6-MP 50 mg 2 pills QD. Diarrhea resolved. Abdominal pain has mostly resolved. She stopped taking oral iron on the 15th.   On 4/1/20, she was doing well. She stopped prednisone at her last visit.  She continued on 6-mp 100 mg daily. She denied abdominal pain, diarrhea, or rectal bleeding.  On 12/2/20, she said she d/c 6-MP in July at the direction of her PCP (Dr. Aleksandra Bellamy) after presenting for a missed menstrual cycle. She again saw Dr. Bellamy in follow-up and was told to stay off 6-MP. Around this time she was having left-sided pain. An x-ray and MRI were done. She said the imaging found fibroids "pushing up against her kidney," which the patient was told was the cause of her pain. On Dec 18 she will have a uterine ablation. Her other two doctors, Dr. Haley (surgeon treating her fibroids) and Dr. Pinzon (OBGYN, also recommended she stay off 6-MP).  However, given she had been off treatment for Crohn's disease since July 2020 she presented with 4-6 loose BMs/day. She also reported rectal bleeding with each BM. Left-sided pain had persisted.  On 12/15/20, she said she started on prednisone (decreased to 35 mg QD on Sunday) and resumed 6-MP. Frequency decreased to 1 BM/day with formation. Bleeding had decreased. She continued to report intermittent cramping before a BM. She had an appetite, but was afraid to liberalize her diet.  She started on iron 1 pill QD.  On 1/27/21, she said she tapered off prednisone on Sunday. She reported onset of bilateral lower back pain in the last week in addition to fatigue. She continued on 6-MP 50 mg 2 pills QD. She was off iron. She had liberalized her diet more since last visit, but was still on a low-fiber diet.  On 4/28/21, she said she decreased to 6-MP 50 mg 1 pill QD. She was currently asymptomatic GI-wise. She had her uterine fibroid embolization on March 16 and had labs done 2 weeks prior to that. She had temporary constipation following her procedure. Lower back pain had resolved.  On 8/3/21, she reported onset of left-sided abdominal pain and lower back discomfort 3 weeks ago. As a result, she slept on a pillow  on both hips which provided some benefit. Having a BM also lessened discomfort. She had not seen a rheumatologist. 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 She was trying meditation in the morning to relieve stress and count down from 100 when stressed. She had not had a recurrence of diarrhea or rectal bleeding. She continued on 6-MP 50 mg 1 pill QD. She continued on oral iron which had decreased her BM frequency to QOD she stated. She had started on vit D supplementation 1x/week since early June.  On 11/9/21, she said she was 7-weeks pregnant. She continued on 6-MP 50 mg 1 pill QD. She had not had abdominal pain, rectal bleeding, or diarrhea. She had a BM/2-3 days that she attributed to her pregnancy - she drank prune juice to help her bowel habit. She continued on oral iron. She decreased vit D dosage to 1000 IU (prenatal vitamins also have vit D). Next bloodwork was on Tuesday.  On 2/23/22, she said she went to the ER on New Year's Charla due to left suprapubic pain - labs and MRI were done she stated. She was told her pain was due to a combination of stress, a "growing uterus," and fibroids. She was currently 25 weeks pregnant. She was not having diarrhea or rectal bleeding currently. She had 1 formed BM QD. Last month she was more constipated - would go up to 2 days without a BM. She felt some abdominal tightness but attributed it to stress. She had labs done on Nov 16 with her OBGYN - iron normal. She last saw her OBGYN on Jan 11.  On 4/26/22, she said she had 6-weeks to go for the pregnancy. She continued on 6-MP 50 mg 1 pill QD, iron, and vit D. Labs done in Feb - recheck on May 4.  On 8/3/22, she said she delivered her son on July 2nd (no epidural, 8 lbs 7 oz). She was breastfeeding. She last took 6-MP on July 1st because she was instructed to d/c all meds after being discharged from the hospital (patient was unsure why). She did not have diarrhea, bleeding, or abdominal pain. She took iron during her pregnancy and continued on iron.  Recommended she resume 6-mp (dump breast milk after 4 hrs after taking 6-mp).    On 10/11/22, she said she had a recent flare - bleeding returned 10 days ago, cramping, and looser stools. She had not had blood in her stool for the past 3 days. Abdominal pain was improving. She last had a BM yesterday which was soft - no longer watery. She resumed her 6-MP on Oct 3. She d/c iron following her last lab results in Aug. She was no longer on vit D. Her son is 14 weeks old (currently using breast milk stored when not taking 6-mp) - planned to stop breastfeeding at 1 year old.  On 11/29/22, she said she had 0-1 BMs QD that were formed. No rectal bleeding she stated. No abdominal pain. She continued on 6-MP 50 mg 1 pill QD.  On 2/28/23, she said she continued on 6-MP 50 mg 1 pill QD - no diarrhea, bleeding, or pain. She took a prenatal vitamin (had iron in it) QOD.  On 6/13/23, the patient said she continued on 6-MP 50 mg 1 pill QD - no diarrhea, bleeding, or abdominal pain. Her PCP did not put her on vit D supplementation after her low vit D lab result. She was still on a prenatal vitamin.   HPI Patient presents after recent stay at [facility] for bloody diarrhea/left sided abdominal pain.  Seen 7/18/23 by our consult team.  Stool study negative for an infection.  Eventually began prednisone up to 40 mg daily - been on 40 mg daily x 1 week.  Today, she says BMs down to 3-4/day and no blood x 5 days.  Left sided abdominal pain improving.  She notes daily nausea/vomiting since ED stay and ondansetron is not beneficial.  She also states she has not been truthful in regard to taking 6-mp - last took Dec. 2022 when she had Covid.   She resumed it on 7/10/23.   Labs 7/18/23 - CBC normal except MCV 76. CMP normal except potassium 2.9. Lipase normal. 4/6/23 - CBC normal (hgb 14, platelets 260) except MCV 78.8. Iron 116, , iron sat 36%, ferritin 183, vit D 24. CMP, TSH all normal.    10/11/22 - 6-, 6-MMP 2021. Iron sat 47%, ferritin 247, iron 128%, TIBC 274. CMP normal except creatinine 1.07, ALT 35. CBC, Vit D all normal. 8/3/22 - CBC normal except hgb 16. CMP normal except AST 47, ALT 48. Iron sat 47%, ferritin 247, TIBC 274, iron 128. Vit D normal. 2/23/22 - 6-, 6-MMPN 2042. CMP normal except BUN 5. TIBC 324, UIBC 254, iron 70, iron sat 22%, ferritin 65. CBC normal. 4/28/21 - 6-, 6-MMPN 4912. Iron 9, hgb electrophoresis - hgb C trait (heterozygous), CMP, vit B12/folate all normal. 1/27/21 - 6-, 6-MMPN 92418. CBC normal except WBC 2.6, hgb 10. CMP normal except AST 43, . UA, urine culture all negative. Iron/TIBC, ferritin all normal. 12/2/20 - CBC normal except MCV 76. CMP normal creatinine 1.27. Iron 25, iron sat 7%, ferritin 12. TSH/FT4 normal.  4/1/20 - 6-, 6-MMPN 215. CMP normal except creatinine 1.38, GFR 57. CBC, iron/TIBC all normal. 2/3/20 - 6-, 6-MMPN 86961. CBC normal except platelets 486. CMP normal. 1/15/20 - 6-, 6-MMPN 51223.  12/11/19 - CMP normal except sodium 135, ALT 59. 12/10/19 - CBC normal except hgb 9.8, MCV 69.6, platelets 713. 12/4/19 - TPMP normal metabolizer. 12/2/19 - CBC normal except WBC 12.3, hgb 11, MCV 71.4, platelets 620. Sed rate 87, haptoglobin 399, INR 1.42, Iron 15, TIBC 214, iron sat 7%, CRP 38.80. Ferritin normal. 10/4/19 -Mg 1.7. CBC, alk phos all normal. CMP normal except creatinine 1.11.

## 2023-08-10 LAB
A/G RATIO: 1.3
ALBUMIN: 3.3
ALKALINE PHOSPHATASE: 70
ALT (SGPT): 17
AST (SGOT): 13
BASO (ABSOLUTE): 0
BASOS: 1
BILIRUBIN, TOTAL: 0.5
BUN/CREATININE RATIO: 11
BUN: 8
C-REACTIVE PROTEIN, QUANT: 16
CALCIUM: 8.5
CARBON DIOXIDE, TOTAL: 24
CHLORIDE: 100
CREATININE: 0.75
EGFR: 104
EOS (ABSOLUTE): 0.2
EOS: 4
FERRITIN, SERUM: 300
GLOBULIN, TOTAL: 2.5
GLUCOSE: 98
HEMATOCRIT: 31.2
HEMATOLOGY COMMENTS:: (no result)
HEMOGLOBIN: 9.8
IMMATURE CELLS: (no result)
IMMATURE GRANS (ABS): 0
IMMATURE GRANULOCYTES: 1
IRON BIND.CAP.(TIBC): 204
IRON SATURATION: 16
IRON: 33
LYMPHS (ABSOLUTE): 1.3
LYMPHS: 33
MCH: 26
MCHC: 31.4
MCV: 83
MONOCYTES(ABSOLUTE): 0.3
MONOCYTES: 8
NEUTROPHILS (ABSOLUTE): 2.1
NEUTROPHILS: 53
NRBC: (no result)
PLATELETS: 487
POTASSIUM: 3.9
PROTEIN, TOTAL: 5.8
RBC: 3.77
RDW: 14.2
SODIUM: 136
UIBC: 171
WBC: 3.8

## 2023-08-18 ENCOUNTER — WEB ENCOUNTER (OUTPATIENT)
Dept: URBAN - METROPOLITAN AREA CLINIC 124 | Facility: CLINIC | Age: 38
End: 2023-08-18

## 2023-08-23 ENCOUNTER — OFFICE VISIT (OUTPATIENT)
Dept: URBAN - METROPOLITAN AREA CLINIC 105 | Facility: CLINIC | Age: 38
End: 2023-08-23
Payer: COMMERCIAL

## 2023-08-23 VITALS
HEIGHT: 70 IN | TEMPERATURE: 97.7 F | DIASTOLIC BLOOD PRESSURE: 62 MMHG | BODY MASS INDEX: 22.68 KG/M2 | HEART RATE: 96 BPM | SYSTOLIC BLOOD PRESSURE: 88 MMHG | WEIGHT: 158.4 LBS

## 2023-08-23 DIAGNOSIS — D50.9 IRON DEFICIENCY ANEMIA: ICD-10-CM

## 2023-08-23 DIAGNOSIS — K50.80 CROHN'S COLITIS: ICD-10-CM

## 2023-08-23 PROCEDURE — 99214 OFFICE O/P EST MOD 30 MIN: CPT | Performed by: INTERNAL MEDICINE

## 2023-08-23 RX ORDER — HYOSCYAMINE SULFATE 0.12 MG/1
1 TABLET TABLET SUBLINGUAL EVERY 6 HOURS
Qty: 30 | Refills: 2 | Status: ACTIVE | COMMUNITY
Start: 2023-08-09 | End: 2023-11-06

## 2023-08-23 RX ORDER — PREDNISONE 10 MG/1
4 TABLETS TABLET ORAL
Qty: 120 | Refills: 2 | Status: ACTIVE | COMMUNITY
Start: 2023-08-09 | End: 2023-11-06

## 2023-08-23 RX ORDER — ONDANSETRON HYDROCHLORIDE 4 MG/1
1-2 TABLETS TABLET, FILM COATED ORAL EVERY 6 HOURS
Qty: 45 | Refills: 2 | Status: ACTIVE | COMMUNITY
Start: 2023-08-09

## 2023-08-23 RX ORDER — FERROUS GLUCONATE 324 MG
1 TABLET WITH WATER OR JUICE BETWEEN MEALS TABLET ORAL ONCE A DAY
Qty: 90 | Refills: 1 | Status: ON HOLD | COMMUNITY
Start: 2020-12-03

## 2023-08-23 RX ORDER — PROMETHAZINE HYDROCHLORIDE 12.5 MG/1
1-2 TABLETS TABLET ORAL
Qty: 45 | Refills: 2 | Status: ACTIVE | COMMUNITY
Start: 2023-08-09 | End: 2023-11-06

## 2023-08-23 RX ORDER — PREDNISONE 10 MG/1
4 TABLETS TABLET ORAL ONCE A DAY
Qty: 120 TABLET | Refills: 1 | OUTPATIENT
Start: 2023-07-20 | End: 2023-10-22

## 2023-08-23 RX ORDER — PREDNISONE 10 MG/1
4 TABLETS TABLET ORAL ONCE A DAY
Qty: 120 TABLET | Refills: 1 | Status: ACTIVE | COMMUNITY
Start: 2023-07-20 | End: 2023-09-18

## 2023-08-23 RX ORDER — MERCAPTOPURINE 50 MG/1
1 TAB TABLET ORAL DAILY
Qty: 90 | Refills: 1 | Status: ACTIVE | COMMUNITY
Start: 2020-12-02

## 2023-08-23 RX ORDER — CHROMIUM 200 MCG
1 TABLET TABLET ORAL ONCE A DAY
Status: ON HOLD | COMMUNITY

## 2023-08-23 NOTE — HPI-TODAY'S VISIT:
The patient presents on follow-up for Crohn's disease.  PAST MEDICAL HISTORY:  On 12/20/19, the patient said that in November, she started reporting abdominal pain, nausea/vomiting, and bloody diarrhea. She went to the ER on 11/23. She was diagnosed with the flu and discharged with Zofran and a flu medication. Symptoms persisted. She presented to the ER again on 11/28 with the same symptoms in addition to increased leg/arm pain and decreased mobility. A flu test was not done at the first visit, so it was done this time. It turned out she did not have the flu. Fluid was found in her ear and she was given a diagnosis of an ear infection. A colonoscopy was done on 12/4/19 with Dr. Capps which noted Crohn's colitis.  She was discharged on prednisone 40 mg daily and told her it would decrease the swelling in her legs. She was in the ER again on 12/10-12/14. Her prednisone was increased to 60 mg daily. It was administered intravenously for the first 2 days of her visit and then orally the rest of her stay. She was also given pain medication and Zofran.  Dr. Stephens recommended starting Remicade which she refused as an inpatient.     On 1/15/20, she says she started on 6-MP 50 mg 2 pills QD on 1/2/20. She was on prednisone 20 mg QD and would taper down to 15 mg on Jan 19.  She now reported insomnia and didn't urinate as often. She had 1-2 formed BMs/day with no bleeding. Side pain improved. Nausea resolved. She was on oral iron 1 pill QD.  On 2/18/20, she said she was on her last day of prednisone 2.5 mg. She was taking 6-MP 50 mg 2 pills QD. Diarrhea resolved. Abdominal pain has mostly resolved. She stopped taking oral iron on the 15th.   On 4/1/20, she was doing well. She stopped prednisone at her last visit.  She continued on 6-mp 100 mg daily. She denied abdominal pain, diarrhea, or rectal bleeding.  On 12/2/20, she said she d/c 6-MP in July at the direction of her PCP (Dr. Aleksandra Bellamy) after presenting for a missed menstrual cycle. She again saw Dr. Bellamy in follow-up and was told to stay off 6-MP. Around this time she was having left-sided pain. An x-ray and MRI were done. She said the imaging found fibroids "pushing up against her kidney," which the patient was told was the cause of her pain. On Dec 18 she will have a uterine ablation. Her other two doctors, Dr. Haley (surgeon treating her fibroids) and Dr. Pinzon (OBGYN, also recommended she stay off 6-MP).  However, given she had been off treatment for Crohn's disease since July 2020 she presented with 4-6 loose BMs/day. She also reported rectal bleeding with each BM. Left-sided pain had persisted.  On 12/15/20, she said she started on prednisone (decreased to 35 mg QD on Sunday) and resumed 6-MP. Frequency decreased to 1 BM/day with formation. Bleeding had decreased. She continued to report intermittent cramping before a BM. She had an appetite, but was afraid to liberalize her diet.  She started on iron 1 pill QD.  On 1/27/21, she said she tapered off prednisone on Sunday. She reported onset of bilateral lower back pain in the last week in addition to fatigue. She continued on 6-MP 50 mg 2 pills QD. She was off iron. She had liberalized her diet more since last visit, but was still on a low-fiber diet.  On 4/28/21, she said she decreased to 6-MP 50 mg 1 pill QD. She was currently asymptomatic GI-wise. She had her uterine fibroid embolization on March 16 and had labs done 2 weeks prior to that. She had temporary constipation following her procedure. Lower back pain had resolved.  On 8/3/21, she reported onset of left-sided abdominal pain and lower back discomfort 3 weeks ago. As a result, she slept on a pillow  on both hips which provided some benefit. Having a BM also lessened discomfort. She had not seen a rheumatologist. 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 She was trying meditation in the morning to relieve stress and count down from 100 when stressed. She had not had a recurrence of diarrhea or rectal bleeding. She continued on 6-MP 50 mg 1 pill QD. She continued on oral iron which had decreased her BM frequency to QOD she stated. She had started on vit D supplementation 1x/week since early June.  On 11/9/21, she said she was 7-weeks pregnant. She continued on 6-MP 50 mg 1 pill QD. She had not had abdominal pain, rectal bleeding, or diarrhea. She had a BM/2-3 days that she attributed to her pregnancy - she drank prune juice to help her bowel habit. She continued on oral iron. She decreased vit D dosage to 1000 IU (prenatal vitamins also have vit D). Next bloodwork was on Tuesday.  On 2/23/22, she said she went to the ER on New Year's Charla due to left suprapubic pain - labs and MRI were done she stated. She was told her pain was due to a combination of stress, a "growing uterus," and fibroids. She was currently 25 weeks pregnant. She was not having diarrhea or rectal bleeding currently. She had 1 formed BM QD. Last month she was more constipated - would go up to 2 days without a BM. She felt some abdominal tightness but attributed it to stress. She had labs done on Nov 16 with her OBGYN - iron normal. She last saw her OBGYN on Jan 11.  On 4/26/22, she said she had 6-weeks to go for the pregnancy. She continued on 6-MP 50 mg 1 pill QD, iron, and vit D. Labs done in Feb - recheck on May 4.  On 8/3/22, she said she delivered her son on July 2nd (no epidural, 8 lbs 7 oz). She was breastfeeding. She last took 6-MP on July 1st because she was instructed to d/c all meds after being discharged from the hospital (patient was unsure why). She did not have diarrhea, bleeding, or abdominal pain. She took iron during her pregnancy and continued on iron.  Recommended she resume 6-mp (dump breast milk after 4 hrs after taking 6-mp).    On 10/11/22, she said she had a recent flare - bleeding returned 10 days ago, cramping, and looser stools. She had not had blood in her stool for the past 3 days. Abdominal pain was improving. She last had a BM yesterday which was soft - no longer watery. She resumed her 6-MP on Oct 3. She d/c iron following her last lab results in Aug. She was no longer on vit D. Her son is 14 weeks old (currently using breast milk stored when not taking 6-mp) - planned to stop breastfeeding at 1 year old.  On 11/29/22, she said she had 0-1 BMs QD that were formed. No rectal bleeding she stated. No abdominal pain. She continued on 6-MP 50 mg 1 pill QD.  On 2/28/23, she said she continued on 6-MP 50 mg 1 pill QD - no diarrhea, bleeding, or pain. She took a prenatal vitamin (had iron in it) QOD.  On 6/13/23, the patient said she continued on 6-MP 50 mg 1 pill QD - no diarrhea, bleeding, or abdominal pain. Her PCP did not put her on vit D supplementation after her low vit D lab result. She was still on a prenatal vitamin.   On 8/9/23, the patient presents after recent stay at [place] [facility] for bloody diarrhea/left sided abdominal pain.  Seen 7/18/23 by our consult team.  Stool study negative for an infection.  Eventually began prednisone up to 40 mg daily - been on 40 mg daily x 1 week.  Today, she said BMs down to 3-4/day and no blood x 5 days.  Left sided abdominal pain improving.  She noted daily nausea/vomiting since ED stay and ondansetron was not beneficial.  She also stated she has not been truthful in regard to taking 6-MP - last took Dec. 2022 when she had Covid.   She resumed it on 7/10/23.  HPI: Today, she says she feels 75% better. She has 2-3 soft-serve BMs/day. No blood for the past 3 weeks. She has not tapered prednisone yet - currently on prednisone 10 mg 4 pills QD. She is cramping, sometimes associates it with coldness. Question if hyoscyamine helps. She has chills and night sweats. Not much of an appetite. She is drinking Ensure 2/day. Occasional dizziness, lightheadedness, and weakness. Ondansetron does not help, but promethazine does. Promethazine makes her sleepy. She is on 6-MP 50 mg 2 pills QD. She has a lot of gas (belching and passage of gas).     Labs 8/9/23 - CBC normal except hgb 9.8, platelets 487. CMP normal except albumin 3.3. TIBC 204, iron 33, iron sat 16%, UIBC 171, ferritin 300, CRP 16. 7/18/23 - CBC normal except MCV 76. CMP normal except potassium 2.9. Lipase normal. 4/6/23 - CBC normal (hgb 14, platelets 260) except MCV 78.8. Iron 116, , iron sat 36%, ferritin 183, vit D 24. CMP, TSH all normal.    10/11/22 - 6-, 6-MMP 2021. Iron sat 47%, ferritin 247, iron 128%, TIBC 274. CMP normal except creatinine 1.07, ALT 35. CBC, Vit D all normal. 8/3/22 - CBC normal except hgb 16. CMP normal except AST 47, ALT 48. Iron sat 47%, ferritin 247, TIBC 274, iron 128. Vit D normal. 2/23/22 - 6-, 6-MMPN 2042. CMP normal except BUN 5. TIBC 324, UIBC 254, iron 70, iron sat 22%, ferritin 65. CBC normal. 4/28/21 - 6-, 6-MMPN 4912. Iron 9, hgb electrophoresis - hgb C trait (heterozygous), CMP, vit B12/folate all normal. 1/27/21 - 6-, 6-MMPN 53774. CBC normal except WBC 2.6, hgb 10. CMP normal except AST 43, . UA, urine culture all negative. Iron/TIBC, ferritin all normal. 12/2/20 - CBC normal except MCV 76. CMP normal creatinine 1.27. Iron 25, iron sat 7%, ferritin 12. TSH/FT4 normal.  4/1/20 - 6-, 6-MMPN 215. CMP normal except creatinine 1.38, GFR 57. CBC, iron/TIBC all normal. 2/3/20 - 6-, 6-MMPN 10960. CBC normal except platelets 486. CMP normal. 1/15/20 - 6-, 6-MMPN 35557.  12/11/19 - CMP normal except sodium 135, ALT 59. 12/10/19 - CBC normal except hgb 9.8, MCV 69.6, platelets 713. 12/4/19 - TPMP normal metabolizer. 12/2/19 - CBC normal except WBC 12.3, hgb 11, MCV 71.4, platelets 620. Sed rate 87, haptoglobin 399, INR 1.42, Iron 15, TIBC 214, iron sat 7%, CRP 38.80. Ferritin normal. 10/4/19 -Mg 1.7. CBC, alk phos all normal. CMP normal except creatinine 1.11.

## 2023-08-30 LAB
6-MMPN: 4400
6-TGN: 512
BASO (ABSOLUTE): 0
BASOS: 1
CMV QUANT DNA PCR (PLASMA): NEGATIVE
EOS (ABSOLUTE): 0.1
EOS: 1
HEMATOCRIT: 31.6
HEMATOLOGY COMMENTS:: (no result)
HEMOGLOBIN: 10.3
IMMATURE CELLS: (no result)
IMMATURE GRANS (ABS): 0
IMMATURE GRANULOCYTES: 1
LOG10 CMV QN DNA PL: (no result)
LYMPHS (ABSOLUTE): 0.5
LYMPHS: 12
Lab: (no result)
MCH: 26.9
MCHC: 32.6
MCV: 83
MONOCYTES(ABSOLUTE): 0.3
MONOCYTES: 7
NEUTROPHILS (ABSOLUTE): 3.4
NEUTROPHILS: 78
NRBC: (no result)
PLATELETS: 588
RBC: 3.83
RDW: 17.3
WBC: 4.3

## 2023-09-15 ENCOUNTER — OFFICE VISIT (OUTPATIENT)
Dept: URBAN - METROPOLITAN AREA CLINIC 105 | Facility: CLINIC | Age: 38
End: 2023-09-15
Payer: COMMERCIAL

## 2023-09-15 VITALS
HEART RATE: 92 BPM | BODY MASS INDEX: 23.91 KG/M2 | WEIGHT: 167 LBS | DIASTOLIC BLOOD PRESSURE: 72 MMHG | SYSTOLIC BLOOD PRESSURE: 104 MMHG | HEIGHT: 70 IN | TEMPERATURE: 97.3 F

## 2023-09-15 DIAGNOSIS — R74.01 ELEVATED TRANSAMINASE LEVEL: ICD-10-CM

## 2023-09-15 DIAGNOSIS — D50.9 IRON DEFICIENCY ANEMIA: ICD-10-CM

## 2023-09-15 DIAGNOSIS — E55.9 VITAMIN D DEFICIENCY: ICD-10-CM

## 2023-09-15 DIAGNOSIS — K50.90 CROHN'S DISEASE: ICD-10-CM

## 2023-09-15 PROCEDURE — 99214 OFFICE O/P EST MOD 30 MIN: CPT | Performed by: INTERNAL MEDICINE

## 2023-09-15 RX ORDER — PREDNISONE 10 MG/1
4 TABLETS TABLET ORAL
Qty: 120 | Refills: 2 | Status: ON HOLD | COMMUNITY
Start: 2023-08-09 | End: 2023-11-06

## 2023-09-15 RX ORDER — PREDNISONE 10 MG/1
4 TABLETS TABLET ORAL ONCE A DAY
Qty: 120 TABLET | Refills: 1 | Status: ACTIVE | COMMUNITY
Start: 2023-07-20 | End: 2023-10-22

## 2023-09-15 RX ORDER — PROMETHAZINE HYDROCHLORIDE 12.5 MG/1
1-2 TABLETS TABLET ORAL
Qty: 45 | Refills: 2 | Status: ACTIVE | COMMUNITY
Start: 2023-08-09 | End: 2023-11-06

## 2023-09-15 RX ORDER — ONDANSETRON HYDROCHLORIDE 4 MG/1
1-2 TABLETS TABLET, FILM COATED ORAL EVERY 6 HOURS
Qty: 45 | Refills: 2 | Status: ACTIVE | COMMUNITY
Start: 2023-08-09

## 2023-09-15 RX ORDER — CHROMIUM 200 MCG
1 TABLET TABLET ORAL ONCE A DAY
Status: ON HOLD | COMMUNITY

## 2023-09-15 RX ORDER — FERROUS GLUCONATE 324 MG
1 TABLET WITH WATER OR JUICE BETWEEN MEALS TABLET ORAL ONCE A DAY
Qty: 90 | Refills: 1 | Status: ON HOLD | COMMUNITY
Start: 2020-12-03

## 2023-09-15 RX ORDER — MERCAPTOPURINE 50 MG/1
1 TAB TABLET ORAL DAILY
Qty: 90 | Refills: 1 | Status: ACTIVE | COMMUNITY
Start: 2020-12-02

## 2023-09-15 RX ORDER — HYOSCYAMINE SULFATE 0.12 MG/1
1 TABLET TABLET SUBLINGUAL EVERY 6 HOURS
Qty: 30 | Refills: 2 | Status: ACTIVE | COMMUNITY
Start: 2023-08-09 | End: 2023-11-06

## 2023-09-15 NOTE — HPI-TODAY'S VISIT:
The patient presents on follow-up for Crohn's disease.  PAST MEDICAL HISTORY:  On 12/20/19, the patient said that in November, she started reporting abdominal pain, nausea/vomiting, and bloody diarrhea. She went to the ER on 11/23. She was diagnosed with the flu and discharged with Zofran and a flu medication. Symptoms persisted. She presented to the ER again on 11/28 with the same symptoms in addition to increased leg/arm pain and decreased mobility. A flu test was not done at the first visit, so it was done this time. It turned out she did not have the flu. Fluid was found in her ear and she was given a diagnosis of an ear infection. A colonoscopy was done on 12/4/19 with Dr. Capps which noted Crohn's colitis.  She was discharged on prednisone 40 mg daily and told her it would decrease the swelling in her legs. She was in the ER again on 12/10-12/14. Her prednisone was increased to 60 mg daily. It was administered intravenously for the first 2 days of her visit and then orally the rest of her stay. She was also given pain medication and Zofran.  Dr. Stephens recommended starting Remicade which she refused as an inpatient.     On 1/15/20, she says she started on 6-MP 50 mg 2 pills QD on 1/2/20. She was on prednisone 20 mg QD and would taper down to 15 mg on Jan 19.  She now reported insomnia and didn't urinate as often. She had 1-2 formed BMs/day with no bleeding. Side pain improved. Nausea resolved. She was on oral iron 1 pill QD.  On 2/18/20, she said she was on her last day of prednisone 2.5 mg. She was taking 6-MP 50 mg 2 pills QD. Diarrhea resolved. Abdominal pain has mostly resolved. She stopped taking oral iron on the 15th.   On 4/1/20, she was doing well. She stopped prednisone at her last visit.  She continued on 6-mp 100 mg daily. She denied abdominal pain, diarrhea, or rectal bleeding.  On 12/2/20, she said she d/c 6-MP in July at the direction of her PCP (Dr. Aleksandra Bellamy) after presenting for a missed menstrual cycle. She again saw Dr. Bellamy in follow-up and was told to stay off 6-MP. Around this time she was having left-sided pain. An x-ray and MRI were done. She said the imaging found fibroids "pushing up against her kidney," which the patient was told was the cause of her pain. On Dec 18 she will have a uterine ablation. Her other two doctors, Dr. Haley (surgeon treating her fibroids) and Dr. Pinzon (OBGYN, also recommended she stay off 6-MP).  However, given she had been off treatment for Crohn's disease since July 2020 she presented with 4-6 loose BMs/day. She also reported rectal bleeding with each BM. Left-sided pain had persisted.  On 12/15/20, she said she started on prednisone (decreased to 35 mg QD on Sunday) and resumed 6-MP. Frequency decreased to 1 BM/day with formation. Bleeding had decreased. She continued to report intermittent cramping before a BM. She had an appetite, but was afraid to liberalize her diet.  She started on iron 1 pill QD.  On 1/27/21, she said she tapered off prednisone on Sunday. She reported onset of bilateral lower back pain in the last week in addition to fatigue. She continued on 6-MP 50 mg 2 pills QD. She was off iron. She had liberalized her diet more since last visit, but was still on a low-fiber diet.  On 4/28/21, she said she decreased to 6-MP 50 mg 1 pill QD. She was currently asymptomatic GI-wise. She had her uterine fibroid embolization on March 16 and had labs done 2 weeks prior to that. She had temporary constipation following her procedure. Lower back pain had resolved.  On 8/3/21, she reported onset of left-sided abdominal pain and lower back discomfort 3 weeks ago. As a result, she slept on a pillow  on both hips which provided some benefit. Having a BM also lessened discomfort. She had not seen a rheumatologist. 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 She was trying meditation in the morning to relieve stress and count down from 100 when stressed. She had not had a recurrence of diarrhea or rectal bleeding. She continued on 6-MP 50 mg 1 pill QD. She continued on oral iron which had decreased her BM frequency to QOD she stated. She had started on vit D supplementation 1x/week since early June.  On 11/9/21, she said she was 7-weeks pregnant. She continued on 6-MP 50 mg 1 pill QD. She had not had abdominal pain, rectal bleeding, or diarrhea. She had a BM/2-3 days that she attributed to her pregnancy - she drank prune juice to help her bowel habit. She continued on oral iron. She decreased vit D dosage to 1000 IU (prenatal vitamins also have vit D). Next bloodwork was on Tuesday.  On 2/23/22, she said she went to the ER on New Year's Charla due to left suprapubic pain - labs and MRI were done she stated. She was told her pain was due to a combination of stress, a "growing uterus," and fibroids. She was currently 25 weeks pregnant. She was not having diarrhea or rectal bleeding currently. She had 1 formed BM QD. Last month she was more constipated - would go up to 2 days without a BM. She felt some abdominal tightness but attributed it to stress. She had labs done on Nov 16 with her OBGYN - iron normal. She last saw her OBGYN on Jan 11.  On 4/26/22, she said she had 6-weeks to go for the pregnancy. She continued on 6-MP 50 mg 1 pill QD, iron, and vit D. Labs done in Feb - recheck on May 4.  On 8/3/22, she said she delivered her son on July 2nd (no epidural, 8 lbs 7 oz). She was breastfeeding. She last took 6-MP on July 1st because she was instructed to d/c all meds after being discharged from the hospital (patient was unsure why). She did not have diarrhea, bleeding, or abdominal pain. She took iron during her pregnancy and continued on iron.  Recommended she resume 6-mp (dump breast milk after 4 hrs after taking 6-mp).    On 10/11/22, she said she had a recent flare - bleeding returned 10 days ago, cramping, and looser stools. She had not had blood in her stool for the past 3 days. Abdominal pain was improving. She last had a BM yesterday which was soft - no longer watery. She resumed her 6-MP on Oct 3. She d/c iron following her last lab results in Aug. She was no longer on vit D. Her son is 14 weeks old (currently using breast milk stored when not taking 6-mp) - planned to stop breastfeeding at 1 year old.  On 11/29/22, she said she had 0-1 BMs QD that were formed. No rectal bleeding she stated. No abdominal pain. She continued on 6-MP 50 mg 1 pill QD.  On 2/28/23, she said she continued on 6-MP 50 mg 1 pill QD - no diarrhea, bleeding, or pain. She took a prenatal vitamin (had iron in it) QOD.  On 6/13/23, the patient said she continued on 6-MP 50 mg 1 pill QD - no diarrhea, bleeding, or abdominal pain. Her PCP did not put her on vit D supplementation after her low vit D lab result. She was still on a prenatal vitamin.   On 8/9/23, the patient presents after recent stay at [facility] for bloody diarrhea/left sided abdominal pain.  Seen 7/18/23 by our consult team.  Stool study negative for an infection.  Eventually began prednisone up to 40 mg daily - been on 40 mg daily x 1 week.  Today, she said BMs down to 3-4/day and no blood x 5 days.  Left sided abdominal pain improving.  She noted daily nausea/vomiting since ED stay and ondansetron was not beneficial.  She also stated she has not been truthful in regard to taking 6-MP - last took Dec. 2022 when she had Covid.   She resumed it on 7/10/23.  On 8/23/23, she said she felt 75% better. She had 2-3 soft-serve BMs/day. No blood for the past 3 weeks. She had not tapered prednisone yet - currently on prednisone 10 mg 4 pills QD. She was cramping, sometimes associated it with coldness. Question if hyoscyamine helped. She had chills and night sweats. Not much of an appetite. She was drinking Ensure 2/day. Occasional dizziness, lightheadedness, and weakness. Ondansetron did not help, but promethazine did. Promethazine made her sleepy. She was on 6-MP 50 mg 2 pills QD. She had a lot of gas (belching and passage of gas).   HPI: Today, she says she has tapered down to prednisone 25 mg QD last week. 2 days after tapering, she had a formed BM but recurrence of bleeding (on stool and tissue). She had another episode of bleeding 2 days ago, but that stool was looser. She was supposed to taper to 20 mg today, but decided not to due to her recent bleeding. Yesterday did not have a BM. She has some cramping which she takes hyoscyamine for. She takes promethazine most nights to help her sleep which works.   Labs 8/23/23 - 6-, 6-MMPN 4400. CBC normal except hgb 10.3, platelets 588. CMV PCR negative. 8/9/23 - CBC normal except hgb 9.8, platelets 487. CMP normal except albumin 3.3. TIBC 204, iron 33, iron sat 16%, UIBC 171, ferritin 300, CRP 16. 7/18/23 - CBC normal except MCV 76. CMP normal except potassium 2.9. Lipase normal. 4/6/23 - CBC normal (hgb 14, platelets 260) except MCV 78.8. Iron 116, , iron sat 36%, ferritin 183, vit D 24. CMP, TSH all normal.    10/11/22 - 6-, 6-MMP 2021. Iron sat 47%, ferritin 247, iron 128%, TIBC 274. CMP normal except creatinine 1.07, ALT 35. CBC, Vit D all normal. 8/3/22 - CBC normal except hgb 16. CMP normal except AST 47, ALT 48. Iron sat 47%, ferritin 247, TIBC 274, iron 128. Vit D normal. 2/23/22 - 6-, 6-MMPN 2042. CMP normal except BUN 5. TIBC 324, UIBC 254, iron 70, iron sat 22%, ferritin 65. CBC normal. 4/28/21 - 6-, 6-MMPN 4912. Iron 9, hgb electrophoresis - hgb C trait (heterozygous), CMP, vit B12/folate all normal. 1/27/21 - 6-, 6-MMPN 49623. CBC normal except WBC 2.6, hgb 10. CMP normal except AST 43, . UA, urine culture all negative. Iron/TIBC, ferritin all normal. 12/2/20 - CBC normal except MCV 76. CMP normal creatinine 1.27. Iron 25, iron sat 7%, ferritin 12. TSH/FT4 normal.  4/1/20 - 6-, 6-MMPN 215. CMP normal except creatinine 1.38, GFR 57. CBC, iron/TIBC all normal. 2/3/20 - 6-, 6-MMPN 18554. CBC normal except platelets 486. CMP normal. 1/15/20 - 6-, 6-MMPN 45032.  12/11/19 - CMP normal except sodium 135, ALT 59. 12/10/19 - CBC normal except hgb 9.8, MCV 69.6, platelets 713. 12/4/19 - TPMP normal metabolizer. 12/2/19 - CBC normal except WBC 12.3, hgb 11, MCV 71.4, platelets 620. Sed rate 87, haptoglobin 399, INR 1.42, Iron 15, TIBC 214, iron sat 7%, CRP 38.80. Ferritin normal. 10/4/19 -Mg 1.7. CBC, alk phos all normal. CMP normal except creatinine 1.11.

## 2023-09-19 ENCOUNTER — TELEPHONE ENCOUNTER (OUTPATIENT)
Dept: URBAN - METROPOLITAN AREA CLINIC 105 | Facility: CLINIC | Age: 38
End: 2023-09-19

## 2023-10-04 ENCOUNTER — LAB OUTSIDE AN ENCOUNTER (OUTPATIENT)
Dept: URBAN - METROPOLITAN AREA CLINIC 105 | Facility: CLINIC | Age: 38
End: 2023-10-04

## 2023-10-04 ENCOUNTER — OFFICE VISIT (OUTPATIENT)
Dept: URBAN - METROPOLITAN AREA CLINIC 105 | Facility: CLINIC | Age: 38
End: 2023-10-04
Payer: COMMERCIAL

## 2023-10-04 VITALS
WEIGHT: 173 LBS | TEMPERATURE: 97.7 F | HEIGHT: 70 IN | BODY MASS INDEX: 24.77 KG/M2 | DIASTOLIC BLOOD PRESSURE: 74 MMHG | HEART RATE: 81 BPM | SYSTOLIC BLOOD PRESSURE: 112 MMHG

## 2023-10-04 DIAGNOSIS — R74.01 ELEVATED TRANSAMINASE LEVEL: ICD-10-CM

## 2023-10-04 DIAGNOSIS — K50.80 CROHN'S COLITIS: ICD-10-CM

## 2023-10-04 DIAGNOSIS — R35.1 FREQUENT URINATION AT NIGHT: ICD-10-CM

## 2023-10-04 DIAGNOSIS — D50.9 IRON DEFICIENCY ANEMIA: ICD-10-CM

## 2023-10-04 PROCEDURE — 99214 OFFICE O/P EST MOD 30 MIN: CPT | Performed by: INTERNAL MEDICINE

## 2023-10-04 RX ORDER — CHROMIUM 200 MCG
1 TABLET TABLET ORAL ONCE A DAY
Status: ON HOLD | COMMUNITY

## 2023-10-04 RX ORDER — HYOSCYAMINE SULFATE 0.12 MG/1
1 TABLET TABLET SUBLINGUAL EVERY 6 HOURS
Qty: 30 | Refills: 2 | Status: ON HOLD | COMMUNITY
Start: 2023-08-09 | End: 2023-11-06

## 2023-10-04 RX ORDER — PROMETHAZINE HYDROCHLORIDE 12.5 MG/1
1-2 TABLETS TABLET ORAL
Qty: 45 | Refills: 2 | Status: ON HOLD | COMMUNITY
Start: 2023-08-09 | End: 2023-11-06

## 2023-10-04 RX ORDER — PREDNISONE 10 MG/1
4 TABLETS TABLET ORAL ONCE A DAY
Qty: 120 TABLET | Refills: 1 | Status: ACTIVE | COMMUNITY
Start: 2023-07-20 | End: 2023-10-22

## 2023-10-04 RX ORDER — MERCAPTOPURINE 50 MG/1
1 TAB TABLET ORAL DAILY
Qty: 90 | Refills: 1 | Status: ACTIVE | COMMUNITY
Start: 2020-12-02

## 2023-10-04 RX ORDER — ONDANSETRON HYDROCHLORIDE 4 MG/1
1-2 TABLETS TABLET, FILM COATED ORAL EVERY 6 HOURS
Qty: 45 | Refills: 2 | Status: ON HOLD | COMMUNITY
Start: 2023-08-09

## 2023-10-04 RX ORDER — FERROUS GLUCONATE 324 MG
1 TABLET WITH WATER OR JUICE BETWEEN MEALS TABLET ORAL ONCE A DAY
Qty: 90 | Refills: 1 | Status: ON HOLD | COMMUNITY
Start: 2020-12-03

## 2023-10-04 RX ORDER — PREDNISONE 10 MG/1
4 TABLETS TABLET ORAL
Qty: 120 | Refills: 2 | Status: ON HOLD | COMMUNITY
Start: 2023-08-09 | End: 2023-11-06

## 2023-10-04 NOTE — HPI-TODAY'S VISIT:
The patient presents on follow-up for Crohn's disease.  PAST MEDICAL HISTORY:  On 12/20/19, the patient said that in November, she started reporting abdominal pain, nausea/vomiting, and bloody diarrhea. She went to the ER on 11/23. She was diagnosed with the flu and discharged with Zofran and a flu medication. Symptoms persisted. She presented to the ER again on 11/28 with the same symptoms in addition to increased leg/arm pain and decreased mobility. A flu test was not done at the first visit, so it was done this time. It turned out she did not have the flu. Fluid was found in her ear and she was given a diagnosis of an ear infection. A colonoscopy was done on 12/4/19 with Dr. Capps which noted Crohn's colitis.  She was discharged on prednisone 40 mg daily and told her it would decrease the swelling in her legs. She was in the ER again on 12/10-12/14. Her prednisone was increased to 60 mg daily. It was administered intravenously for the first 2 days of her visit and then orally the rest of her stay. She was also given pain medication and Zofran.  Dr. Stephens recommended starting Remicade which she refused as an inpatient.     On 1/15/20, she says she started on 6-MP 50 mg 2 pills QD on 1/2/20. She was on prednisone 20 mg QD and would taper down to 15 mg on Jan 19.  She now reported insomnia and didn't urinate as often. She had 1-2 formed BMs/day with no bleeding. Side pain improved. Nausea resolved. She was on oral iron 1 pill QD.  On 2/18/20, she said she was on her last day of prednisone 2.5 mg. She was taking 6-MP 50 mg 2 pills QD. Diarrhea resolved. Abdominal pain has mostly resolved. She stopped taking oral iron on the 15th.   On 4/1/20, she was doing well. She stopped prednisone at her last visit.  She continued on 6-mp 100 mg daily. She denied abdominal pain, diarrhea, or rectal bleeding.  On 12/2/20, she said she d/c 6-MP in July at the direction of her PCP (Dr. Aleksandra Bellamy) after presenting for a missed menstrual cycle. She again saw Dr. Bellamy in follow-up and was told to stay off 6-MP. Around this time she was having left-sided pain. An x-ray and MRI were done. She said the imaging found fibroids "pushing up against her kidney," which the patient was told was the cause of her pain. On Dec 18 she will have a uterine ablation. Her other two doctors, Dr. Haley (surgeon treating her fibroids) and Dr. Pinzon (OBGYN, also recommended she stay off 6-MP).  However, given she had been off treatment for Crohn's disease since July 2020 she presented with 4-6 loose BMs/day. She also reported rectal bleeding with each BM. Left-sided pain had persisted.  On 12/15/20, she said she started on prednisone (decreased to 35 mg QD on Sunday) and resumed 6-MP. Frequency decreased to 1 BM/day with formation. Bleeding had decreased. She continued to report intermittent cramping before a BM. She had an appetite, but was afraid to liberalize her diet.  She started on iron 1 pill QD.  On 1/27/21, she said she tapered off prednisone on Sunday. She reported onset of bilateral lower back pain in the last week in addition to fatigue. She continued on 6-MP 50 mg 2 pills QD. She was off iron. She had liberalized her diet more since last visit, but was still on a low-fiber diet.  On 4/28/21, she said she decreased to 6-MP 50 mg 1 pill QD. She was currently asymptomatic GI-wise. She had her uterine fibroid embolization on March 16 and had labs done 2 weeks prior to that. She had temporary constipation following her procedure. Lower back pain had resolved.  On 8/3/21, she reported onset of left-sided abdominal pain and lower back discomfort 3 weeks ago. As a result, she slept on a pillow  on both hips which provided some benefit. Having a BM also lessened discomfort. She had not seen a rheumatologist. 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 She was trying meditation in the morning to relieve stress and count down from 100 when stressed. She had not had a recurrence of diarrhea or rectal bleeding. She continued on 6-MP 50 mg 1 pill QD. She continued on oral iron which had decreased her BM frequency to QOD she stated. She had started on vit D supplementation 1x/week since early June.  On 11/9/21, she said she was 7-weeks pregnant. She continued on 6-MP 50 mg 1 pill QD. She had not had abdominal pain, rectal bleeding, or diarrhea. She had a BM/2-3 days that she attributed to her pregnancy - she drank prune juice to help her bowel habit. She continued on oral iron. She decreased vit D dosage to 1000 IU (prenatal vitamins also have vit D). Next bloodwork was on Tuesday.  On 2/23/22, she said she went to the ER on New Year's Charla due to left suprapubic pain - labs and MRI were done she stated. She was told her pain was due to a combination of stress, a "growing uterus," and fibroids. She was currently 25 weeks pregnant. She was not having diarrhea or rectal bleeding currently. She had 1 formed BM QD. Last month she was more constipated - would go up to 2 days without a BM. She felt some abdominal tightness but attributed it to stress. She had labs done on Nov 16 with her OBGYN - iron normal. She last saw her OBGYN on Jan 11.  On 4/26/22, she said she had 6-weeks to go for the pregnancy. She continued on 6-MP 50 mg 1 pill QD, iron, and vit D. Labs done in Feb - recheck on May 4.  On 8/3/22, she said she delivered her son on July 2nd (no epidural, 8 lbs 7 oz). She was breastfeeding. She last took 6-MP on July 1st because she was instructed to d/c all meds after being discharged from the hospital (patient was unsure why). She did not have diarrhea, bleeding, or abdominal pain. She took iron during her pregnancy and continued on iron.  Recommended she resume 6-mp (dump breast milk after 4 hrs after taking 6-mp).    On 10/11/22, she said she had a recent flare - bleeding returned 10 days ago, cramping, and looser stools. She had not had blood in her stool for the past 3 days. Abdominal pain was improving. She last had a BM yesterday which was soft - no longer watery. She resumed her 6-MP on Oct 3. She d/c iron following her last lab results in Aug. She was no longer on vit D. Her son is 14 weeks old (currently using breast milk stored when not taking 6-mp) - planned to stop breastfeeding at 1 year old.  On 11/29/22, she said she had 0-1 BMs QD that were formed. No rectal bleeding she stated. No abdominal pain. She continued on 6-MP 50 mg 1 pill QD.  On 2/28/23, she said she continued on 6-MP 50 mg 1 pill QD - no diarrhea, bleeding, or pain. She took a prenatal vitamin (had iron in it) QOD.  On 6/13/23, the patient said she continued on 6-MP 50 mg 1 pill QD - no diarrhea, bleeding, or abdominal pain. Her PCP did not put her on vit D supplementation after her low vit D lab result. She was still on a prenatal vitamin.   On 8/9/23, the patient presents after recent stay at [facility] for bloody diarrhea/left sided abdominal pain.  Seen 7/18/23 by our consult team.  Stool study negative for an infection.  Eventually began prednisone up to 40 mg daily - been on 40 mg daily x 1 week.  Today, she said BMs down to 3-4/day and no blood x 5 days.  Left sided abdominal pain improving.  She noted daily nausea/vomiting since ED stay and ondansetron was not beneficial.  She also stated she has not been truthful in regard to taking 6-MP - last took Dec. 2022 when she had Covid.   She resumed it on 7/10/23.  On 8/23/23, she said she felt 75% better. She had 2-3 soft-serve BMs/day. No blood for the past 3 weeks. She had not tapered prednisone yet - currently on prednisone 10 mg 4 pills QD. She was cramping, sometimes associated it with coldness. Question if hyoscyamine helped. She had chills and night sweats. Not much of an appetite. She was drinking Ensure 2/day. Occasional dizziness, lightheadedness, and weakness. Ondansetron did not help, but promethazine did. Promethazine made her sleepy. She was on 6-MP 50 mg 2 pills QD. She had a lot of gas (belching and passage of gas).   On 9/15/23, she said she had tapered down to prednisone 25 mg QD last week. 2 days after tapering, she had a formed BM but recurrence of bleeding (on stool and tissue). She had another episode of bleeding 2 days ago, but that stool was looser. She was supposed to taper to 20 mg today, but decided not to due to her recent bleeding. Yesterday did not have a BM. She had some cramping which she took hyoscyamine for. She took promethazine most nights to help her sleep which worked.   HPI: Today, she says she is now on prednisone 15 mg QD - been on it for 1 week. She has a daily, formed BM without blood. She is frequently urinating throughout the night. Urine is dark colored.   Her father had colon cancer over age 60 while paternal grandfather had colon cancer less than age 60.  Labs 8/23/23 - 6-, 6-MMPN 4400. CBC normal except hgb 10.3, platelets 588. CMV PCR negative. 8/9/23 - CBC normal except hgb 9.8, platelets 487. CMP normal except albumin 3.3. TIBC 204, iron 33, iron sat 16%, UIBC 171, ferritin 300, CRP 16. 7/18/23 - CBC normal except MCV 76. CMP normal except potassium 2.9. Lipase normal. 4/6/23 - CBC normal (hgb 14, platelets 260) except MCV 78.8. Iron 116, , iron sat 36%, ferritin 183, vit D 24. CMP, TSH all normal.    10/11/22 - 6-, 6-MMP 2021. Iron sat 47%, ferritin 247, iron 128%, TIBC 274. CMP normal except creatinine 1.07, ALT 35. CBC, Vit D all normal. 8/3/22 - CBC normal except hgb 16. CMP normal except AST 47, ALT 48. Iron sat 47%, ferritin 247, TIBC 274, iron 128. Vit D normal. 2/23/22 - 6-, 6-MMPN 2042. CMP normal except BUN 5. TIBC 324, UIBC 254, iron 70, iron sat 22%, ferritin 65. CBC normal. 4/28/21 - 6-, 6-MMPN 4912. Iron 9, hgb electrophoresis - hgb C trait (heterozygous), CMP, vit B12/folate all normal. 1/27/21 - 6-, 6-MMPN 28351. CBC normal except WBC 2.6, hgb 10. CMP normal except AST 43, . UA, urine culture all negative. Iron/TIBC, ferritin all normal. 12/2/20 - CBC normal except MCV 76. CMP normal creatinine 1.27. Iron 25, iron sat 7%, ferritin 12. TSH/FT4 normal.  4/1/20 - 6-, 6-MMPN 215. CMP normal except creatinine 1.38, GFR 57. CBC, iron/TIBC all normal. 2/3/20 - 6-, 6-MMPN 32586. CBC normal except platelets 486. CMP normal. 1/15/20 - 6-, 6-MMPN 61138.  12/11/19 - CMP normal except sodium 135, ALT 59. 12/10/19 - CBC normal except hgb 9.8, MCV 69.6, platelets 713. 12/4/19 - TPMP normal metabolizer. 12/2/19 - CBC normal except WBC 12.3, hgb 11, MCV 71.4, platelets 620. Sed rate 87, haptoglobin 399, INR 1.42, Iron 15, TIBC 214, iron sat 7%, CRP 38.80. Ferritin normal. 10/4/19 -Mg 1.7. CBC, alk phos all normal. CMP normal except creatinine 1.11.

## 2023-10-09 LAB
6-MMPN: 2927
6-TGN: 277
A/G RATIO: 1.7
ALBUMIN: 3.8
ALKALINE PHOSPHATASE: 58
ALT (SGPT): 11
APPEARANCE: CLEAR
AST (SGOT): 16
BACTERIA: (no result)
BILIRUBIN, TOTAL: 0.4
BILIRUBIN: NEGATIVE
BUN/CREATININE RATIO: 6
BUN: 6
CALCIUM: 8.7
CARBON DIOXIDE, TOTAL: 24
CAST TYPE: (no result)
CASTS: (no result)
CHLORIDE: 104
COMMENT: (no result)
CREATININE: 0.95
CRYSTAL TYPE: (no result)
CRYSTALS: (no result)
EGFR: 79
EPITHELIAL CELLS (NON RENAL): (no result)
EPITHELIAL CELLS (RENAL): (no result)
FERRITIN, SERUM: 80
GLOBULIN, TOTAL: 2.3
GLUCOSE: 54
GLUCOSE: NEGATIVE
HEMATOCRIT: 36.6
HEMOGLOBIN: 12
IRON BIND.CAP.(TIBC): 250
IRON SATURATION: 25
IRON: 62
KETONES: NEGATIVE
Lab: (no result)
Lab: NO GROWTH
MCH: 28.7
MCHC: 32.8
MCV: 88
MICROSCOPIC EXAMINATION: (no result)
MICROSCOPIC EXAMINATION: (no result)
MUCUS THREADS: (no result)
NITRITE, URINE: NEGATIVE
NRBC: (no result)
OCCULT BLOOD: NEGATIVE
PH: 6
PLATELETS: 327
POTASSIUM: 3.7
PROTEIN, TOTAL: 6.1
PROTEIN: NEGATIVE
RBC: (no result)
RBC: 4.18
RDW: 15.8
SODIUM: 141
SPECIFIC GRAVITY: 1.02
TRICHOMONAS: (no result)
UIBC: 188
URINE CULTURE, ROUTINE: (no result)
URINE-COLOR: YELLOW
UROBILINOGEN,SEMI-QN: 0.2
WBC ESTERASE: NEGATIVE
WBC: (no result)
WBC: 5.9
YEAST: (no result)

## 2023-10-13 ENCOUNTER — WEB ENCOUNTER (OUTPATIENT)
Dept: URBAN - METROPOLITAN AREA CLINIC 105 | Facility: CLINIC | Age: 38
End: 2023-10-13

## 2023-10-13 RX ORDER — MERCAPTOPURINE 50 MG/1
2.5 TABS TABLET ORAL DAILY
Qty: 225 TABLET | Refills: 1 | OUTPATIENT
Start: 2020-12-02

## 2023-10-23 ENCOUNTER — WEB ENCOUNTER (OUTPATIENT)
Dept: URBAN - METROPOLITAN AREA CLINIC 105 | Facility: CLINIC | Age: 38
End: 2023-10-23

## 2023-10-30 ENCOUNTER — OFFICE VISIT (OUTPATIENT)
Dept: URBAN - METROPOLITAN AREA SURGERY CENTER 16 | Facility: SURGERY CENTER | Age: 38
End: 2023-10-30

## 2023-11-08 ENCOUNTER — CLAIMS CREATED FROM THE CLAIM WINDOW (OUTPATIENT)
Dept: URBAN - METROPOLITAN AREA CLINIC 105 | Facility: CLINIC | Age: 38
End: 2023-11-08
Payer: COMMERCIAL

## 2023-11-08 VITALS
HEART RATE: 82 BPM | SYSTOLIC BLOOD PRESSURE: 110 MMHG | BODY MASS INDEX: 25.91 KG/M2 | DIASTOLIC BLOOD PRESSURE: 75 MMHG | TEMPERATURE: 97.9 F | HEIGHT: 70 IN | WEIGHT: 181 LBS

## 2023-11-08 DIAGNOSIS — R74.01 ELEVATED TRANSAMINASE LEVEL: ICD-10-CM

## 2023-11-08 DIAGNOSIS — K50.90 CROHN'S DISEASE: ICD-10-CM

## 2023-11-08 DIAGNOSIS — M54.5 LOWER BACK PAIN: ICD-10-CM

## 2023-11-08 DIAGNOSIS — R63.4 UNINTENTIONAL WEIGHT LOSS: ICD-10-CM

## 2023-11-08 DIAGNOSIS — E55.9 VITAMIN D DEFICIENCY: ICD-10-CM

## 2023-11-08 DIAGNOSIS — Z12.83 SKIN CANCER SCREENING: ICD-10-CM

## 2023-11-08 DIAGNOSIS — D25.9 UTERINE LEIOMYOMA, UNSPECIFIED LOCATION: ICD-10-CM

## 2023-11-08 DIAGNOSIS — Z14.8 HEMOGLOBIN C VARIANT CARRIER: ICD-10-CM

## 2023-11-08 DIAGNOSIS — D50.9 IRON DEFICIENCY ANEMIA: ICD-10-CM

## 2023-11-08 DIAGNOSIS — M54.50 LOWER BACK PAIN: ICD-10-CM

## 2023-11-08 PROCEDURE — 99214 OFFICE O/P EST MOD 30 MIN: CPT | Performed by: INTERNAL MEDICINE

## 2023-11-08 RX ORDER — ONDANSETRON HYDROCHLORIDE 4 MG/1
1-2 TABLETS TABLET, FILM COATED ORAL EVERY 6 HOURS
Qty: 45 | Refills: 2 | Status: ON HOLD | COMMUNITY
Start: 2023-08-09

## 2023-11-08 RX ORDER — CHROMIUM 200 MCG
1 TABLET TABLET ORAL ONCE A DAY
Status: ON HOLD | COMMUNITY

## 2023-11-08 RX ORDER — FERROUS GLUCONATE 324 MG
1 TABLET WITH WATER OR JUICE BETWEEN MEALS TABLET ORAL ONCE A DAY
Qty: 90 | Refills: 1 | Status: ON HOLD | COMMUNITY
Start: 2020-12-03

## 2023-11-08 RX ORDER — MERCAPTOPURINE 50 MG/1
2.5 TABS TABLET ORAL DAILY
Qty: 225 TABLET | Refills: 1 | Status: ACTIVE | COMMUNITY
Start: 2020-12-02

## 2023-11-08 NOTE — HPI-TODAY'S VISIT:
The patient presents on follow-up for Crohn's disease.  PAST MEDICAL HISTORY:  On 12/20/19, the patient said that in November, she started reporting abdominal pain, nausea/vomiting, and bloody diarrhea. She went to the ER on 11/23. She was diagnosed with the flu and discharged with Zofran and a flu medication. Symptoms persisted. She presented to the ER again on 11/28 with the same symptoms in addition to increased leg/arm pain and decreased mobility. A flu test was not done at the first visit, so it was done this time. It turned out she did not have the flu. Fluid was found in her ear and she was given a diagnosis of an ear infection. A colonoscopy was done on 12/4/19 with Dr. Capps which noted Crohn's colitis.  She was discharged on prednisone 40 mg daily and told her it would decrease the swelling in her legs. She was in the ER again on 12/10-12/14. Her prednisone was increased to 60 mg daily. It was administered intravenously for the first 2 days of her visit and then orally the rest of her stay. She was also given pain medication and Zofran.  Dr. Stephens recommended starting Remicade which she refused as an inpatient.     On 1/15/20, she says she started on 6-MP 50 mg 2 pills QD on 1/2/20. She was on prednisone 20 mg QD and would taper down to 15 mg on Jan 19.  She now reported insomnia and didn't urinate as often. She had 1-2 formed BMs/day with no bleeding. Side pain improved. Nausea resolved. She was on oral iron 1 pill QD.  On 2/18/20, she said she was on her last day of prednisone 2.5 mg. She was taking 6-MP 50 mg 2 pills QD. Diarrhea resolved. Abdominal pain has mostly resolved. She stopped taking oral iron on the 15th.   On 4/1/20, she was doing well. She stopped prednisone at her last visit.  She continued on 6-mp 100 mg daily. She denied abdominal pain, diarrhea, or rectal bleeding.  On 12/2/20, she said she d/c 6-MP in July at the direction of her PCP (Dr. Aleksandra Bellamy) after presenting for a missed menstrual cycle. She again saw Dr. Bellamy in follow-up and was told to stay off 6-MP. Around this time she was having left-sided pain. An x-ray and MRI were done. She said the imaging found fibroids "pushing up against her kidney," which the patient was told was the cause of her pain. On Dec 18 she will have a uterine ablation. Her other two doctors, Dr. Haley (surgeon treating her fibroids) and Dr. Pinzon (OBGYN, also recommended she stay off 6-MP).  However, given she had been off treatment for Crohn's disease since July 2020 she presented with 4-6 loose BMs/day. She also reported rectal bleeding with each BM. Left-sided pain had persisted.  On 12/15/20, she said she started on prednisone (decreased to 35 mg QD on Sunday) and resumed 6-MP. Frequency decreased to 1 BM/day with formation. Bleeding had decreased. She continued to report intermittent cramping before a BM. She had an appetite, but was afraid to liberalize her diet.  She started on iron 1 pill QD.  On 1/27/21, she said she tapered off prednisone on Sunday. She reported onset of bilateral lower back pain in the last week in addition to fatigue. She continued on 6-MP 50 mg 2 pills QD. She was off iron. She had liberalized her diet more since last visit, but was still on a low-fiber diet.  On 4/28/21, she said she decreased to 6-MP 50 mg 1 pill QD. She was currently asymptomatic GI-wise. She had her uterine fibroid embolization on March 16 and had labs done 2 weeks prior to that. She had temporary constipation following her procedure. Lower back pain had resolved.  On 8/3/21, she reported onset of left-sided abdominal pain and lower back discomfort 3 weeks ago. As a result, she slept on a pillow  on both hips which provided some benefit. Having a BM also lessened discomfort. She had not seen a rheumatologist. 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 She was trying meditation in the morning to relieve stress and count down from 100 when stressed. She had not had a recurrence of diarrhea or rectal bleeding. She continued on 6-MP 50 mg 1 pill QD. She continued on oral iron which had decreased her BM frequency to QOD she stated. She had started on vit D supplementation 1x/week since early June.  On 11/9/21, she said she was 7-weeks pregnant. She continued on 6-MP 50 mg 1 pill QD. She had not had abdominal pain, rectal bleeding, or diarrhea. She had a BM/2-3 days that she attributed to her pregnancy - she drank prune juice to help her bowel habit. She continued on oral iron. She decreased vit D dosage to 1000 IU (prenatal vitamins also have vit D). Next bloodwork was on Tuesday.  On 2/23/22, she said she went to the ER on New Year's Charla due to left suprapubic pain - labs and MRI were done she stated. She was told her pain was due to a combination of stress, a "growing uterus," and fibroids. She was currently 25 weeks pregnant. She was not having diarrhea or rectal bleeding currently. She had 1 formed BM QD. Last month she was more constipated - would go up to 2 days without a BM. She felt some abdominal tightness but attributed it to stress. She had labs done on Nov 16 with her OBGYN - iron normal. She last saw her OBGYN on Jan 11.  On 4/26/22, she said she had 6-weeks to go for the pregnancy. She continued on 6-MP 50 mg 1 pill QD, iron, and vit D. Labs done in Feb - recheck on May 4.  On 8/3/22, she said she delivered her son on July 2nd (no epidural, 8 lbs 7 oz). She was breastfeeding. She last took 6-MP on July 1st because she was instructed to d/c all meds after being discharged from the hospital (patient was unsure why). She did not have diarrhea, bleeding, or abdominal pain. She took iron during her pregnancy and continued on iron.  Recommended she resume 6-mp (dump breast milk after 4 hrs after taking 6-mp).    On 10/11/22, she said she had a recent flare - bleeding returned 10 days ago, cramping, and looser stools. She had not had blood in her stool for the past 3 days. Abdominal pain was improving. She last had a BM yesterday which was soft - no longer watery. She resumed her 6-MP on Oct 3. She d/c iron following her last lab results in Aug. She was no longer on vit D. Her son is 14 weeks old (currently using breast milk stored when not taking 6-mp) - planned to stop breastfeeding at 1 year old.  On 11/29/22, she said she had 0-1 BMs QD that were formed. No rectal bleeding she stated. No abdominal pain. She continued on 6-MP 50 mg 1 pill QD.  On 2/28/23, she said she continued on 6-MP 50 mg 1 pill QD - no diarrhea, bleeding, or pain. She took a prenatal vitamin (had iron in it) QOD.  On 6/13/23, the patient said she continued on 6-MP 50 mg 1 pill QD - no diarrhea, bleeding, or abdominal pain. Her PCP did not put her on vit D supplementation after her low vit D lab result. She was still on a prenatal vitamin.   On 8/9/23, the patient presents after recent stay at [place] [facility] for bloody diarrhea/left sided abdominal pain.  Seen 7/18/23 by our consult team.  Stool study negative for an infection.  Eventually began prednisone up to 40 mg daily - been on 40 mg daily x 1 week.  Today, she said BMs down to 3-4/day and no blood x 5 days.  Left sided abdominal pain improving.  She noted daily nausea/vomiting since ED stay and ondansetron was not beneficial.  She also stated she has not been truthful in regard to taking 6-MP - last took Dec. 2022 when she had Covid.   She resumed it on 7/10/23.  On 8/23/23, she said she felt 75% better. She had 2-3 soft-serve BMs/day. No blood for the past 3 weeks. She had not tapered prednisone yet - currently on prednisone 10 mg 4 pills QD. She was cramping, sometimes associated it with coldness. Question if hyoscyamine helped. She had chills and night sweats. Not much of an appetite. She was drinking Ensure 2/day. Occasional dizziness, lightheadedness, and weakness. Ondansetron did not help, but promethazine did. Promethazine made her sleepy. She was on 6-MP 50 mg 2 pills QD. She had a lot of gas (belching and passage of gas).   On 9/15/23, she said she had tapered down to prednisone 25 mg QD last week. 2 days after tapering, she had a formed BM but recurrence of bleeding (on stool and tissue). She had another episode of bleeding 2 days ago, but that stool was looser. She was supposed to taper to 20 mg today, but decided not to due to her recent bleeding. Yesterday did not have a BM. She had some cramping which she took hyoscyamine for. She took promethazine most nights to help her sleep which worked.   On 10/4/23, she said she was now on prednisone 15 mg QD - been on it for 1 week. She had a daily, formed BM without blood. She was frequently urinating throughout the night. Urine was dark colored.   Her father had colon cancer over age 60 while paternal grandfather had colon cancer less than age 60.  HPI: Today, she says she is off prednisone. She is on 6-MP 50 mg, now 2.5 pills QD. No diarrhea or rectal bleeding. Still has left sided abdominal pain, left back pain, and fatigue. Left sided abdominal pain is not as bad as before.  Labs 10/4/23 - 6-, 6-MMPN 2927. CMP normal except albumin 3.8. Iron 62, iron sat 25%, TIBC 250, UIBC 188, ferritin 80. UA, urine culture all negative. CBC normal. 8/23/23 - 6-, 6-MMPN 4400. CBC normal except hgb 10.3, platelets 588. CMV PCR negative. 8/9/23 - CBC normal except hgb 9.8, platelets 487. CMP normal except albumin 3.3. TIBC 204, iron 33, iron sat 16%, UIBC 171, ferritin 300, CRP 16. 7/18/23 - CBC normal except MCV 76. CMP normal except potassium 2.9. Lipase normal. 4/6/23 - CBC normal (hgb 14, platelets 260) except MCV 78.8. Iron 116, , iron sat 36%, ferritin 183, vit D 24. CMP, TSH all normal.    10/11/22 - 6-, 6-MMP 2021. Iron sat 47%, ferritin 247, iron 128%, TIBC 274. CMP normal except creatinine 1.07, ALT 35. CBC, Vit D all normal. 8/3/22 - CBC normal except hgb 16. CMP normal except AST 47, ALT 48. Iron sat 47%, ferritin 247, TIBC 274, iron 128. Vit D normal. 2/23/22 - 6-, 6-MMPN 2042. CMP normal except BUN 5. TIBC 324, UIBC 254, iron 70, iron sat 22%, ferritin 65. CBC normal. 4/28/21 - 6-, 6-MMPN 4912. Iron 9, hgb electrophoresis - hgb C trait (heterozygous), CMP, vit B12/folate all normal. 1/27/21 - 6-, 6-MMPN 31969. CBC normal except WBC 2.6, hgb 10. CMP normal except AST 43, . UA, urine culture all negative. Iron/TIBC, ferritin all normal. 12/2/20 - CBC normal except MCV 76. CMP normal creatinine 1.27. Iron 25, iron sat 7%, ferritin 12. TSH/FT4 normal.  4/1/20 - 6-, 6-MMPN 215. CMP normal except creatinine 1.38, GFR 57. CBC, iron/TIBC all normal. 2/3/20 - 6-, 6-MMPN 42535. CBC normal except platelets 486. CMP normal. 1/15/20 - 6-, 6-MMPN 54852.  12/11/19 - CMP normal except sodium 135, ALT 59. 12/10/19 - CBC normal except hgb 9.8, MCV 69.6, platelets 713. 12/4/19 - TPMP normal metabolizer. 12/2/19 - CBC normal except WBC 12.3, hgb 11, MCV 71.4, platelets 620. Sed rate 87, haptoglobin 399, INR 1.42, Iron 15, TIBC 214, iron sat 7%, CRP 38.80. Ferritin normal. 10/4/19 -Mg 1.7. CBC, alk phos all normal. CMP normal except creatinine 1.11.

## 2023-11-12 PROBLEM — 87522002 IRON DEFICIENCY ANEMIA: Status: ACTIVE | Noted: 2020-12-02

## 2023-11-13 LAB
6-MMPN: 4063
6-TGN: 394
Lab: (no result)

## 2024-01-10 ENCOUNTER — OFFICE VISIT (OUTPATIENT)
Dept: URBAN - METROPOLITAN AREA CLINIC 105 | Facility: CLINIC | Age: 39
End: 2024-01-10
Payer: COMMERCIAL

## 2024-01-10 VITALS
TEMPERATURE: 97 F | SYSTOLIC BLOOD PRESSURE: 103 MMHG | DIASTOLIC BLOOD PRESSURE: 73 MMHG | HEIGHT: 70 IN | BODY MASS INDEX: 28.2 KG/M2 | WEIGHT: 197 LBS | HEART RATE: 86 BPM

## 2024-01-10 DIAGNOSIS — D50.9 IRON DEFICIENCY ANEMIA: ICD-10-CM

## 2024-01-10 DIAGNOSIS — D25.9 UTERINE LEIOMYOMA, UNSPECIFIED LOCATION: ICD-10-CM

## 2024-01-10 DIAGNOSIS — Z12.83 SKIN CANCER SCREENING: ICD-10-CM

## 2024-01-10 DIAGNOSIS — E55.9 VITAMIN D DEFICIENCY: ICD-10-CM

## 2024-01-10 DIAGNOSIS — M54.50 LOWER BACK PAIN: ICD-10-CM

## 2024-01-10 DIAGNOSIS — Z14.8 HEMOGLOBIN C VARIANT CARRIER: ICD-10-CM

## 2024-01-10 DIAGNOSIS — R63.4 UNINTENTIONAL WEIGHT LOSS: ICD-10-CM

## 2024-01-10 DIAGNOSIS — R74.01 ELEVATED TRANSAMINASE LEVEL: ICD-10-CM

## 2024-01-10 DIAGNOSIS — K50.90 CROHN'S DISEASE: ICD-10-CM

## 2024-01-10 PROBLEM — 34000006 CROHN'S DISEASE: Status: ACTIVE | Noted: 2020-12-02

## 2024-01-10 PROCEDURE — 99214 OFFICE O/P EST MOD 30 MIN: CPT | Performed by: INTERNAL MEDICINE

## 2024-01-10 RX ORDER — CHROMIUM 200 MCG
1 TABLET TABLET ORAL ONCE A DAY
Status: ON HOLD | COMMUNITY

## 2024-01-10 RX ORDER — MERCAPTOPURINE 50 MG/1
2.5 TABS TABLET ORAL DAILY
Qty: 225 TABLET | Refills: 1 | Status: ACTIVE | COMMUNITY
Start: 2020-12-02

## 2024-01-10 RX ORDER — ONDANSETRON HYDROCHLORIDE 4 MG/1
1-2 TABLETS TABLET, FILM COATED ORAL EVERY 6 HOURS
Qty: 45 | Refills: 2 | Status: ON HOLD | COMMUNITY
Start: 2023-08-09

## 2024-01-10 RX ORDER — FERROUS GLUCONATE 324 MG
1 TABLET WITH WATER OR JUICE BETWEEN MEALS TABLET ORAL ONCE A DAY
Qty: 90 | Refills: 1 | Status: ON HOLD | COMMUNITY
Start: 2020-12-03

## 2024-01-10 NOTE — HPI-TODAY'S VISIT:
The patient presents on follow-up for Crohn's disease.  PAST MEDICAL HISTORY:  On 12/20/19, the patient said that in November, she started reporting abdominal pain, nausea/vomiting, and bloody diarrhea. She went to the ER on 11/23. She was diagnosed with the flu and discharged with Zofran and a flu medication. Symptoms persisted. She presented to the ER again on 11/28 with the same symptoms in addition to increased leg/arm pain and decreased mobility. A flu test was not done at the first visit, so it was done this time. It turned out she did not have the flu. Fluid was found in her ear and she was given a diagnosis of an ear infection. A colonoscopy was done on 12/4/19 with Dr. Capps which noted Crohn's colitis.  She was discharged on prednisone 40 mg daily and told her it would decrease the swelling in her legs. She was in the ER again on 12/10-12/14. Her prednisone was increased to 60 mg daily. It was administered intravenously for the first 2 days of her visit and then orally the rest of her stay. She was also given pain medication and Zofran.  Dr. Stephens recommended starting Remicade which she refused as an inpatient.     On 1/15/20, she says she started on 6-MP 50 mg 2 pills QD on 1/2/20. She was on prednisone 20 mg QD and would taper down to 15 mg on Jan 19.  She now reported insomnia and didn't urinate as often. She had 1-2 formed BMs/day with no bleeding. Side pain improved. Nausea resolved. She was on oral iron 1 pill QD.  On 2/18/20, she said she was on her last day of prednisone 2.5 mg. She was taking 6-MP 50 mg 2 pills QD. Diarrhea resolved. Abdominal pain has mostly resolved. She stopped taking oral iron on the 15th.   On 4/1/20, she was doing well. She stopped prednisone at her last visit.  She continued on 6-mp 100 mg daily. She denied abdominal pain, diarrhea, or rectal bleeding.  On 12/2/20, she said she d/c 6-MP in July at the direction of her PCP (Dr. Aleksandra Bellamy) after presenting for a missed menstrual cycle. She again saw Dr. Bellamy in follow-up and was told to stay off 6-MP. Around this time she was having left-sided pain. An x-ray and MRI were done. She said the imaging found fibroids "pushing up against her kidney," which the patient was told was the cause of her pain. On Dec 18 she will have a uterine ablation. Her other two doctors, Dr. Haley (surgeon treating her fibroids) and Dr. Pinzon (OBGYN, also recommended she stay off 6-MP).  However, given she had been off treatment for Crohn's disease since July 2020 she presented with 4-6 loose BMs/day. She also reported rectal bleeding with each BM. Left-sided pain had persisted.  On 12/15/20, she said she started on prednisone (decreased to 35 mg QD on Sunday) and resumed 6-MP. Frequency decreased to 1 BM/day with formation. Bleeding had decreased. She continued to report intermittent cramping before a BM. She had an appetite, but was afraid to liberalize her diet.  She started on iron 1 pill QD.  On 1/27/21, she said she tapered off prednisone on Sunday. She reported onset of bilateral lower back pain in the last week in addition to fatigue. She continued on 6-MP 50 mg 2 pills QD. She was off iron. She had liberalized her diet more since last visit, but was still on a low-fiber diet.  On 4/28/21, she said she decreased to 6-MP 50 mg 1 pill QD. She was currently asymptomatic GI-wise. She had her uterine fibroid embolization on March 16 and had labs done 2 weeks prior to that. She had temporary constipation following her procedure. Lower back pain had resolved.  On 8/3/21, she reported onset of left-sided abdominal pain and lower back discomfort 3 weeks ago. As a result, she slept on a pillow  on both hips which provided some benefit. Having a BM also lessened discomfort. She had not seen a rheumatologist. 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 She was trying meditation in the morning to relieve stress and count down from 100 when stressed. She had not had a recurrence of diarrhea or rectal bleeding. She continued on 6-MP 50 mg 1 pill QD. She continued on oral iron which had decreased her BM frequency to QOD she stated. She had started on vit D supplementation 1x/week since early June.  On 11/9/21, she said she was 7-weeks pregnant. She continued on 6-MP 50 mg 1 pill QD. She had not had abdominal pain, rectal bleeding, or diarrhea. She had a BM/2-3 days that she attributed to her pregnancy - she drank prune juice to help her bowel habit. She continued on oral iron. She decreased vit D dosage to 1000 IU (prenatal vitamins also have vit D). Next bloodwork was on Tuesday.  On 2/23/22, she said she went to the ER on New Year's Charla due to left suprapubic pain - labs and MRI were done she stated. She was told her pain was due to a combination of stress, a "growing uterus," and fibroids. She was currently 25 weeks pregnant. She was not having diarrhea or rectal bleeding currently. She had 1 formed BM QD. Last month she was more constipated - would go up to 2 days without a BM. She felt some abdominal tightness but attributed it to stress. She had labs done on Nov 16 with her OBGYN - iron normal. She last saw her OBGYN on Jan 11.  On 4/26/22, she said she had 6-weeks to go for the pregnancy. She continued on 6-MP 50 mg 1 pill QD, iron, and vit D. Labs done in Feb - recheck on May 4.  On 8/3/22, she said she delivered her son on July 2nd (no epidural, 8 lbs 7 oz). She was breastfeeding. She last took 6-MP on July 1st because she was instructed to d/c all meds after being discharged from the hospital (patient was unsure why). She did not have diarrhea, bleeding, or abdominal pain. She took iron during her pregnancy and continued on iron.  Recommended she resume 6-mp (dump breast milk after 4 hrs after taking 6-mp).    On 10/11/22, she said she had a recent flare - bleeding returned 10 days ago, cramping, and looser stools. She had not had blood in her stool for the past 3 days. Abdominal pain was improving. She last had a BM yesterday which was soft - no longer watery. She resumed her 6-MP on Oct 3. She d/c iron following her last lab results in Aug. She was no longer on vit D. Her son is 14 weeks old (currently using breast milk stored when not taking 6-mp) - planned to stop breastfeeding at 1 year old.  On 11/29/22, she said she had 0-1 BMs QD that were formed. No rectal bleeding she stated. No abdominal pain. She continued on 6-MP 50 mg 1 pill QD.  On 2/28/23, she said she continued on 6-MP 50 mg 1 pill QD - no diarrhea, bleeding, or pain. She took a prenatal vitamin (had iron in it) QOD.  On 6/13/23, the patient said she continued on 6-MP 50 mg 1 pill QD - no diarrhea, bleeding, or abdominal pain. Her PCP did not put her on vit D supplementation after her low vit D lab result. She was still on a prenatal vitamin.   On 8/9/23, the patient presents after recent stay at [place] [facility] for bloody diarrhea/left sided abdominal pain.  Seen 7/18/23 by our consult team.  Stool study negative for an infection.  Eventually began prednisone up to 40 mg daily - been on 40 mg daily x 1 week.  Today, she said BMs down to 3-4/day and no blood x 5 days.  Left sided abdominal pain improving.  She noted daily nausea/vomiting since ED stay and ondansetron was not beneficial.  She also stated she has not been truthful in regard to taking 6-MP - last took Dec. 2022 when she had Covid.   She resumed it on 7/10/23.  On 8/23/23, she said she felt 75% better. She had 2-3 soft-serve BMs/day. No blood for the past 3 weeks. She had not tapered prednisone yet - currently on prednisone 10 mg 4 pills QD. She was cramping, sometimes associated it with coldness. Question if hyoscyamine helped. She had chills and night sweats. Not much of an appetite. She was drinking Ensure 2/day. Occasional dizziness, lightheadedness, and weakness. Ondansetron did not help, but promethazine did. Promethazine made her sleepy. She was on 6-MP 50 mg 2 pills QD. She had a lot of gas (belching and passage of gas).   On 9/15/23, she said she had tapered down to prednisone 25 mg QD last week. 2 days after tapering, she had a formed BM but recurrence of bleeding (on stool and tissue). She had another episode of bleeding 2 days ago, but that stool was looser. She was supposed to taper to 20 mg today, but decided not to due to her recent bleeding. Yesterday did not have a BM. She had some cramping which she took hyoscyamine for. She took promethazine most nights to help her sleep which worked.   On 10/4/23, she said she was now on prednisone 15 mg QD - been on it for 1 week. She had a daily, formed BM without blood. She was frequently urinating throughout the night. Urine was dark colored.   Her father had colon cancer over age 60 while paternal grandfather had colon cancer less than age 60.  On 11/8/23, she said she was off prednisone. She was on 6-MP 50 mg, now 2.5 pills QD. No diarrhea or rectal bleeding. Still had left sided abdominal pain, left back pain, and fatigue. Left sided abdominal pain was not as bad as before.  HPI: Today, she says she has a daily BM without blood or constipation. No abdominal pain. She continues on 6-MP 50 mg 2 pills QD.   Labs 11/8/23 - 6-. 10/4/23 - 6-, 6-MMPN 2927. CMP normal except albumin 3.8. Iron 62, iron sat 25%, TIBC 250, UIBC 188, ferritin 80. UA, urine culture all negative. CBC normal. 8/23/23 - 6-, 6-MMPN 4400. CBC normal except hgb 10.3, platelets 588. CMV PCR negative. 8/9/23 - CBC normal except hgb 9.8, platelets 487. CMP normal except albumin 3.3. TIBC 204, iron 33, iron sat 16%, UIBC 171, ferritin 300, CRP 16. 7/18/23 - CBC normal except MCV 76. CMP normal except potassium 2.9. Lipase normal. 4/6/23 - CBC normal (hgb 14, platelets 260) except MCV 78.8. Iron 116, , iron sat 36%, ferritin 183, vit D 24. CMP, TSH all normal.    10/11/22 - 6-, 6-MMP 2021. Iron sat 47%, ferritin 247, iron 128%, TIBC 274. CMP normal except creatinine 1.07, ALT 35. CBC, Vit D all normal. 8/3/22 - CBC normal except hgb 16. CMP normal except AST 47, ALT 48. Iron sat 47%, ferritin 247, TIBC 274, iron 128. Vit D normal. 2/23/22 - 6-, 6-MMPN 2042. CMP normal except BUN 5. TIBC 324, UIBC 254, iron 70, iron sat 22%, ferritin 65. CBC normal. 4/28/21 - 6-, 6-MMPN 4912. Iron 9, hgb electrophoresis - hgb C trait (heterozygous), CMP, vit B12/folate all normal. 1/27/21 - 6-, 6-MMPN 59927. CBC normal except WBC 2.6, hgb 10. CMP normal except AST 43, . UA, urine culture all negative. Iron/TIBC, ferritin all normal. 12/2/20 - CBC normal except MCV 76. CMP normal creatinine 1.27. Iron 25, iron sat 7%, ferritin 12. TSH/FT4 normal.  4/1/20 - 6-, 6-MMPN 215. CMP normal except creatinine 1.38, GFR 57. CBC, iron/TIBC all normal. 2/3/20 - 6-, 6-MMPN 80669. CBC normal except platelets 486. CMP normal. 1/15/20 - 6-, 6-MMPN 63075.  12/11/19 - CMP normal except sodium 135, ALT 59. 12/10/19 - CBC normal except hgb 9.8, MCV 69.6, platelets 713. 12/4/19 - TPMP normal metabolizer. 12/2/19 - CBC normal except WBC 12.3, hgb 11, MCV 71.4, platelets 620. Sed rate 87, haptoglobin 399, INR 1.42, Iron 15, TIBC 214, iron sat 7%, CRP 38.80. Ferritin normal. 10/4/19 -Mg 1.7. CBC, alk phos all normal. CMP normal except creatinine 1.11.

## 2024-04-03 ENCOUNTER — OV EP (OUTPATIENT)
Dept: URBAN - METROPOLITAN AREA CLINIC 105 | Facility: CLINIC | Age: 39
End: 2024-04-03

## 2024-04-03 RX ORDER — FERROUS GLUCONATE 324 MG
1 TABLET WITH WATER OR JUICE BETWEEN MEALS TABLET ORAL ONCE A DAY
Qty: 90 | Refills: 1 | COMMUNITY
Start: 2020-12-03

## 2024-04-03 RX ORDER — ONDANSETRON HYDROCHLORIDE 4 MG/1
1-2 TABLETS TABLET, FILM COATED ORAL EVERY 6 HOURS
Qty: 45 | Refills: 2 | COMMUNITY
Start: 2023-08-09

## 2024-04-03 RX ORDER — MERCAPTOPURINE 50 MG/1
2.5 TABS TABLET ORAL DAILY
Qty: 225 TABLET | Refills: 1 | COMMUNITY
Start: 2020-12-02

## 2024-04-03 RX ORDER — CHROMIUM 200 MCG
1 TABLET TABLET ORAL ONCE A DAY
COMMUNITY

## 2024-04-03 NOTE — HPI-TODAY'S VISIT:
The patient presents on follow-up for Crohn's disease.  PAST MEDICAL HISTORY:  On 12/20/19, the patient said that in November, she started reporting abdominal pain, nausea/vomiting, and bloody diarrhea. She went to the ER on 11/23. She was diagnosed with the flu and discharged with Zofran and a flu medication. Symptoms persisted. She presented to the ER again on 11/28 with the same symptoms in addition to increased leg/arm pain and decreased mobility. A flu test was not done at the first visit, so it was done this time. It turned out she did not have the flu. Fluid was found in her ear and she was given a diagnosis of an ear infection. A colonoscopy was done on 12/4/19 with Dr. Capps which noted Crohn's colitis.  She was discharged on prednisone 40 mg daily and told her it would decrease the swelling in her legs. She was in the ER again on 12/10-12/14. Her prednisone was increased to 60 mg daily. It was administered intravenously for the first 2 days of her visit and then orally the rest of her stay. She was also given pain medication and Zofran.  Dr. Stephens recommended starting Remicade which she refused as an inpatient.     On 1/15/20, she says she started on 6-MP 50 mg 2 pills QD on 1/2/20. She was on prednisone 20 mg QD and would taper down to 15 mg on Jan 19.  She now reported insomnia and didn't urinate as often. She had 1-2 formed BMs/day with no bleeding. Side pain improved. Nausea resolved. She was on oral iron 1 pill QD.  On 2/18/20, she said she was on her last day of prednisone 2.5 mg. She was taking 6-MP 50 mg 2 pills QD. Diarrhea resolved. Abdominal pain has mostly resolved. She stopped taking oral iron on the 15th.   On 4/1/20, she was doing well. She stopped prednisone at her last visit.  She continued on 6-mp 100 mg daily. She denied abdominal pain, diarrhea, or rectal bleeding.  On 12/2/20, she said she d/c 6-MP in July at the direction of her PCP (Dr. Aleksandra Bellamy) after presenting for a missed menstrual cycle. She again saw Dr. Bellamy in follow-up and was told to stay off 6-MP. Around this time she was having left-sided pain. An x-ray and MRI were done. She said the imaging found fibroids "pushing up against her kidney," which the patient was told was the cause of her pain. On Dec 18 she will have a uterine ablation. Her other two doctors, Dr. Haley (surgeon treating her fibroids) and Dr. Pinzon (OBGYN, also recommended she stay off 6-MP).  However, given she had been off treatment for Crohn's disease since July 2020 she presented with 4-6 loose BMs/day. She also reported rectal bleeding with each BM. Left-sided pain had persisted.  On 12/15/20, she said she started on prednisone (decreased to 35 mg QD on Sunday) and resumed 6-MP. Frequency decreased to 1 BM/day with formation. Bleeding had decreased. She continued to report intermittent cramping before a BM. She had an appetite, but was afraid to liberalize her diet.  She started on iron 1 pill QD.  On 1/27/21, she said she tapered off prednisone on Sunday. She reported onset of bilateral lower back pain in the last week in addition to fatigue. She continued on 6-MP 50 mg 2 pills QD. She was off iron. She had liberalized her diet more since last visit, but was still on a low-fiber diet.  On 4/28/21, she said she decreased to 6-MP 50 mg 1 pill QD. She was currently asymptomatic GI-wise. She had her uterine fibroid embolization on March 16 and had labs done 2 weeks prior to that. She had temporary constipation following her procedure. Lower back pain had resolved.  On 8/3/21, she reported onset of left-sided abdominal pain and lower back discomfort 3 weeks ago. As a result, she slept on a pillow  on both hips which provided some benefit. Having a BM also lessened discomfort. She had not seen a rheumatologist. 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 She was trying meditation in the morning to relieve stress and count down from 100 when stressed. She had not had a recurrence of diarrhea or rectal bleeding. She continued on 6-MP 50 mg 1 pill QD. She continued on oral iron which had decreased her BM frequency to QOD she stated. She had started on vit D supplementation 1x/week since early June.  On 11/9/21, she said she was 7-weeks pregnant. She continued on 6-MP 50 mg 1 pill QD. She had not had abdominal pain, rectal bleeding, or diarrhea. She had a BM/2-3 days that she attributed to her pregnancy - she drank prune juice to help her bowel habit. She continued on oral iron. She decreased vit D dosage to 1000 IU (prenatal vitamins also have vit D). Next bloodwork was on Tuesday.  On 2/23/22, she said she went to the ER on New Year's Charla due to left suprapubic pain - labs and MRI were done she stated. She was told her pain was due to a combination of stress, a "growing uterus," and fibroids. She was currently 25 weeks pregnant. She was not having diarrhea or rectal bleeding currently. She had 1 formed BM QD. Last month she was more constipated - would go up to 2 days without a BM. She felt some abdominal tightness but attributed it to stress. She had labs done on Nov 16 with her OBGYN - iron normal. She last saw her OBGYN on Jan 11.  On 4/26/22, she said she had 6-weeks to go for the pregnancy. She continued on 6-MP 50 mg 1 pill QD, iron, and vit D. Labs done in Feb - recheck on May 4.  On 8/3/22, she said she delivered her son on July 2nd (no epidural, 8 lbs 7 oz). She was breastfeeding. She last took 6-MP on July 1st because she was instructed to d/c all meds after being discharged from the hospital (patient was unsure why). She did not have diarrhea, bleeding, or abdominal pain. She took iron during her pregnancy and continued on iron.  Recommended she resume 6-mp (dump breast milk after 4 hrs after taking 6-mp).    On 10/11/22, she said she had a recent flare - bleeding returned 10 days ago, cramping, and looser stools. She had not had blood in her stool for the past 3 days. Abdominal pain was improving. She last had a BM yesterday which was soft - no longer watery. She resumed her 6-MP on Oct 3. She d/c iron following her last lab results in Aug. She was no longer on vit D. Her son is 14 weeks old (currently using breast milk stored when not taking 6-mp) - planned to stop breastfeeding at 1 year old.  On 11/29/22, she said she had 0-1 BMs QD that were formed. No rectal bleeding she stated. No abdominal pain. She continued on 6-MP 50 mg 1 pill QD.  On 2/28/23, she said she continued on 6-MP 50 mg 1 pill QD - no diarrhea, bleeding, or pain. She took a prenatal vitamin (had iron in it) QOD.  On 6/13/23, the patient said she continued on 6-MP 50 mg 1 pill QD - no diarrhea, bleeding, or abdominal pain. Her PCP did not put her on vit D supplementation after her low vit D lab result. She was still on a prenatal vitamin.   On 8/9/23, the patient presents after recent stay at [place] [facility] for bloody diarrhea/left sided abdominal pain.  Seen 7/18/23 by our consult team.  Stool study negative for an infection.  Eventually began prednisone up to 40 mg daily - been on 40 mg daily x 1 week.  Today, she said BMs down to 3-4/day and no blood x 5 days.  Left sided abdominal pain improving.  She noted daily nausea/vomiting since ED stay and ondansetron was not beneficial.  She also stated she has not been truthful in regard to taking 6-MP - last took Dec. 2022 when she had Covid.   She resumed it on 7/10/23.  On 8/23/23, she said she felt 75% better. She had 2-3 soft-serve BMs/day. No blood for the past 3 weeks. She had not tapered prednisone yet - currently on prednisone 10 mg 4 pills QD. She was cramping, sometimes associated it with coldness. Question if hyoscyamine helped. She had chills and night sweats. Not much of an appetite. She was drinking Ensure 2/day. Occasional dizziness, lightheadedness, and weakness. Ondansetron did not help, but promethazine did. Promethazine made her sleepy. She was on 6-MP 50 mg 2 pills QD. She had a lot of gas (belching and passage of gas).   On 9/15/23, she said she had tapered down to prednisone 25 mg QD last week. 2 days after tapering, she had a formed BM but recurrence of bleeding (on stool and tissue). She had another episode of bleeding 2 days ago, but that stool was looser. She was supposed to taper to 20 mg today, but decided not to due to her recent bleeding. Yesterday did not have a BM. She had some cramping which she took hyoscyamine for. She took promethazine most nights to help her sleep which worked.   On 10/4/23, she said she was now on prednisone 15 mg QD - been on it for 1 week. She had a daily, formed BM without blood. She was frequently urinating throughout the night. Urine was dark colored.   Her father had colon cancer over age 60 while paternal grandfather had colon cancer less than age 60.  On 11/8/23, she said she was off prednisone. She was on 6-MP 50 mg, now 2.5 pills QD. No diarrhea or rectal bleeding. Still had left sided abdominal pain, left back pain, and fatigue. Left sided abdominal pain was not as bad as before.  On 1/10/24, she said she had a daily BM without blood or constipation. No abdominal pain. She continued on 6-MP 50 mg 2 pills QD.   Labs 11/8/23 - 6-. 10/4/23 - 6-, 6-MMPN 2927. CMP normal except albumin 3.8. Iron 62, iron sat 25%, TIBC 250, UIBC 188, ferritin 80. UA, urine culture all negative. CBC normal. 8/23/23 - 6-, 6-MMPN 4400. CBC normal except hgb 10.3, platelets 588. CMV PCR negative. 8/9/23 - CBC normal except hgb 9.8, platelets 487. CMP normal except albumin 3.3. TIBC 204, iron 33, iron sat 16%, UIBC 171, ferritin 300, CRP 16. 7/18/23 - CBC normal except MCV 76. CMP normal except potassium 2.9. Lipase normal. 4/6/23 - CBC normal (hgb 14, platelets 260) except MCV 78.8. Iron 116, , iron sat 36%, ferritin 183, vit D 24. CMP, TSH all normal.    10/11/22 - 6-, 6-MMP 2021. Iron sat 47%, ferritin 247, iron 128%, TIBC 274. CMP normal except creatinine 1.07, ALT 35. CBC, Vit D all normal. 8/3/22 - CBC normal except hgb 16. CMP normal except AST 47, ALT 48. Iron sat 47%, ferritin 247, TIBC 274, iron 128. Vit D normal. 2/23/22 - 6-, 6-MMPN 2042. CMP normal except BUN 5. TIBC 324, UIBC 254, iron 70, iron sat 22%, ferritin 65. CBC normal. 4/28/21 - 6-, 6-MMPN 4912. Iron 9, hgb electrophoresis - hgb C trait (heterozygous), CMP, vit B12/folate all normal. 1/27/21 - 6-, 6-MMPN 39894. CBC normal except WBC 2.6, hgb 10. CMP normal except AST 43, . UA, urine culture all negative. Iron/TIBC, ferritin all normal. 12/2/20 - CBC normal except MCV 76. CMP normal creatinine 1.27. Iron 25, iron sat 7%, ferritin 12. TSH/FT4 normal.  4/1/20 - 6-, 6-MMPN 215. CMP normal except creatinine 1.38, GFR 57. CBC, iron/TIBC all normal. 2/3/20 - 6-, 6-MMPN 99860. CBC normal except platelets 486. CMP normal. 1/15/20 - 6-, 6-MMPN 76289.  12/11/19 - CMP normal except sodium 135, ALT 59. 12/10/19 - CBC normal except hgb 9.8, MCV 69.6, platelets 713. 12/4/19 - TPMP normal metabolizer. 12/2/19 - CBC normal except WBC 12.3, hgb 11, MCV 71.4, platelets 620. Sed rate 87, haptoglobin 399, INR 1.42, Iron 15, TIBC 214, iron sat 7%, CRP 38.80. Ferritin normal. 10/4/19 -Mg 1.7. CBC, alk phos all normal. CMP normal except creatinine 1.11.

## 2024-05-01 ENCOUNTER — LAB OUTSIDE AN ENCOUNTER (OUTPATIENT)
Dept: URBAN - METROPOLITAN AREA CLINIC 105 | Facility: CLINIC | Age: 39
End: 2024-05-01

## 2024-05-01 ENCOUNTER — TELEPHONE ENCOUNTER (OUTPATIENT)
Dept: URBAN - METROPOLITAN AREA CLINIC 105 | Facility: CLINIC | Age: 39
End: 2024-05-01

## 2024-05-01 ENCOUNTER — DASHBOARD ENCOUNTERS (OUTPATIENT)
Age: 39
End: 2024-05-01

## 2024-05-01 ENCOUNTER — OFFICE VISIT (OUTPATIENT)
Dept: URBAN - METROPOLITAN AREA CLINIC 105 | Facility: CLINIC | Age: 39
End: 2024-05-01
Payer: COMMERCIAL

## 2024-05-01 VITALS
TEMPERATURE: 97.8 F | SYSTOLIC BLOOD PRESSURE: 91 MMHG | WEIGHT: 209.6 LBS | HEART RATE: 80 BPM | HEIGHT: 70 IN | DIASTOLIC BLOOD PRESSURE: 66 MMHG | BODY MASS INDEX: 30.01 KG/M2

## 2024-05-01 DIAGNOSIS — R63.4 UNINTENTIONAL WEIGHT LOSS: ICD-10-CM

## 2024-05-01 DIAGNOSIS — M54.50 LOWER BACK PAIN: ICD-10-CM

## 2024-05-01 DIAGNOSIS — D25.9 UTERINE LEIOMYOMA, UNSPECIFIED LOCATION: ICD-10-CM

## 2024-05-01 DIAGNOSIS — D50.9 IRON DEFICIENCY ANEMIA: ICD-10-CM

## 2024-05-01 DIAGNOSIS — E55.9 VITAMIN D DEFICIENCY: ICD-10-CM

## 2024-05-01 DIAGNOSIS — Z14.8 HEMOGLOBIN C VARIANT CARRIER: ICD-10-CM

## 2024-05-01 DIAGNOSIS — K50.90 CROHN'S DISEASE: ICD-10-CM

## 2024-05-01 DIAGNOSIS — R74.01 ELEVATED TRANSAMINASE LEVEL: ICD-10-CM

## 2024-05-01 DIAGNOSIS — Z12.83 SKIN CANCER SCREENING: ICD-10-CM

## 2024-05-01 PROCEDURE — 99214 OFFICE O/P EST MOD 30 MIN: CPT | Performed by: INTERNAL MEDICINE

## 2024-05-01 RX ORDER — MERCAPTOPURINE 50 MG/1
2.5 TABS TABLET ORAL DAILY
Qty: 225 TABLET | Refills: 1 | Status: ACTIVE | COMMUNITY
Start: 2020-12-02

## 2024-05-01 RX ORDER — CHROMIUM 200 MCG
1 TABLET TABLET ORAL ONCE A DAY
Status: ON HOLD | COMMUNITY

## 2024-05-01 RX ORDER — ONDANSETRON HYDROCHLORIDE 4 MG/1
1-2 TABLETS TABLET, FILM COATED ORAL EVERY 6 HOURS
Qty: 45 | Refills: 2 | Status: ON HOLD | COMMUNITY
Start: 2023-08-09

## 2024-05-01 RX ORDER — FERROUS GLUCONATE 324 MG
1 TABLET WITH WATER OR JUICE BETWEEN MEALS TABLET ORAL ONCE A DAY
Qty: 90 | Refills: 1 | Status: ON HOLD | COMMUNITY
Start: 2020-12-03

## 2024-05-08 LAB
6 MMP: 4008
6 TG: 114

## 2024-07-15 ENCOUNTER — WEB ENCOUNTER (OUTPATIENT)
Dept: URBAN - METROPOLITAN AREA CLINIC 105 | Facility: CLINIC | Age: 39
End: 2024-07-15

## 2024-08-28 ENCOUNTER — OFFICE VISIT (OUTPATIENT)
Dept: URBAN - METROPOLITAN AREA CLINIC 105 | Facility: CLINIC | Age: 39
End: 2024-08-28
Payer: COMMERCIAL

## 2024-08-28 VITALS
TEMPERATURE: 97.3 F | WEIGHT: 217.4 LBS | BODY MASS INDEX: 31.12 KG/M2 | SYSTOLIC BLOOD PRESSURE: 97 MMHG | DIASTOLIC BLOOD PRESSURE: 62 MMHG | HEART RATE: 89 BPM | HEIGHT: 70 IN

## 2024-08-28 DIAGNOSIS — D50.8 ACHLORHYDRIC ANEMIA: ICD-10-CM

## 2024-08-28 DIAGNOSIS — K50.80 CROHN'S COLITIS: ICD-10-CM

## 2024-08-28 PROCEDURE — 99214 OFFICE O/P EST MOD 30 MIN: CPT | Performed by: INTERNAL MEDICINE

## 2024-08-28 RX ORDER — FERROUS GLUCONATE 324 MG
1 TABLET WITH WATER OR JUICE BETWEEN MEALS TABLET ORAL ONCE A DAY
Qty: 90 | Refills: 1 | Status: ON HOLD | COMMUNITY
Start: 2020-12-03

## 2024-08-28 RX ORDER — MERCAPTOPURINE 50 MG/1
2.5 TABS TABLET ORAL DAILY
Qty: 225 TABLET | Refills: 1 | Status: ACTIVE | COMMUNITY
Start: 2020-12-02

## 2024-08-28 RX ORDER — ONDANSETRON HYDROCHLORIDE 4 MG/1
1-2 TABLETS TABLET, FILM COATED ORAL EVERY 6 HOURS
Qty: 45 | Refills: 2 | Status: ON HOLD | COMMUNITY
Start: 2023-08-09

## 2024-08-28 NOTE — HPI-TODAY'S VISIT:
The patient presents on follow-up for Crohn's disease.  PAST MEDICAL HISTORY:  On 12/20/19, the patient said that in November, she started reporting abdominal pain, nausea/vomiting, and bloody diarrhea. She went to the ER on 11/23. She was diagnosed with the flu and discharged with Zofran and a flu medication. Symptoms persisted. She presented to the ER again on 11/28 with the same symptoms in addition to increased leg/arm pain and decreased mobility. A flu test was not done at the first visit, so it was done this time. It turned out she did not have the flu. Fluid was found in her ear and she was given a diagnosis of an ear infection. A colonoscopy was done on 12/4/19 with Dr. Capps which noted Crohn's colitis.  She was discharged on prednisone 40 mg daily and told her it would decrease the swelling in her legs. She was in the ER again on 12/10-12/14. Her prednisone was increased to 60 mg daily. It was administered intravenously for the first 2 days of her visit and then orally the rest of her stay. She was also given pain medication and Zofran.  Dr. Stephens recommended starting Remicade which she refused as an inpatient.     On 1/15/20, she says she started on 6-MP 50 mg 2 pills QD on 1/2/20. She was on prednisone 20 mg QD and would taper down to 15 mg on Jan 19.  She now reported insomnia and didn't urinate as often. She had 1-2 formed BMs/day with no bleeding. Side pain improved. Nausea resolved. She was on oral iron 1 pill QD.  On 2/18/20, she said she was on her last day of prednisone 2.5 mg. She was taking 6-MP 50 mg 2 pills QD. Diarrhea resolved. Abdominal pain has mostly resolved. She stopped taking oral iron on the 15th.   On 4/1/20, she was doing well. She stopped prednisone at her last visit.  She continued on 6-mp 100 mg daily. She denied abdominal pain, diarrhea, or rectal bleeding.  On 12/2/20, she said she d/c 6-MP in July at the direction of her PCP (Dr. Aleksandra Bellamy) after presenting for a missed menstrual cycle. She again saw Dr. Bellamy in follow-up and was told to stay off 6-MP. Around this time she was having left-sided pain. An x-ray and MRI were done. She said the imaging found fibroids "pushing up against her kidney," which the patient was told was the cause of her pain. On Dec 18 she will have a uterine ablation. Her other two doctors, Dr. Haley (surgeon treating her fibroids) and Dr. Pinzon (OBGYN, also recommended she stay off 6-MP).  However, given she had been off treatment for Crohn's disease since July 2020 she presented with 4-6 loose BMs/day. She also reported rectal bleeding with each BM. Left-sided pain had persisted.  On 12/15/20, she said she started on prednisone (decreased to 35 mg QD on Sunday) and resumed 6-MP. Frequency decreased to 1 BM/day with formation. Bleeding had decreased. She continued to report intermittent cramping before a BM. She had an appetite, but was afraid to liberalize her diet.  She started on iron 1 pill QD.  On 1/27/21, she said she tapered off prednisone on Sunday. She reported onset of bilateral lower back pain in the last week in addition to fatigue. She continued on 6-MP 50 mg 2 pills QD. She was off iron. She had liberalized her diet more since last visit, but was still on a low-fiber diet.  On 4/28/21, she said she decreased to 6-MP 50 mg 1 pill QD. She was currently asymptomatic GI-wise. She had her uterine fibroid embolization on March 16 and had labs done 2 weeks prior to that. She had temporary constipation following her procedure. Lower back pain had resolved.  On 8/3/21, she reported onset of left-sided abdominal pain and lower back discomfort 3 weeks ago. As a result, she slept on a pillow  on both hips which provided some benefit. Having a BM also lessened discomfort. She had not seen a rheumatologist. 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 She was trying meditation in the morning to relieve stress and count down from 100 when stressed. She had not had a recurrence of diarrhea or rectal bleeding. She continued on 6-MP 50 mg 1 pill QD. She continued on oral iron which had decreased her BM frequency to QOD she stated. She had started on vit D supplementation 1x/week since early June.  On 11/9/21, she said she was 7-weeks pregnant. She continued on 6-MP 50 mg 1 pill QD. She had not had abdominal pain, rectal bleeding, or diarrhea. She had a BM/2-3 days that she attributed to her pregnancy - she drank prune juice to help her bowel habit. She continued on oral iron. She decreased vit D dosage to 1000 IU (prenatal vitamins also have vit D). Next bloodwork was on Tuesday.  On 2/23/22, she said she went to the ER on New Year's Charla due to left suprapubic pain - labs and MRI were done she stated. She was told her pain was due to a combination of stress, a "growing uterus," and fibroids. She was currently 25 weeks pregnant. She was not having diarrhea or rectal bleeding currently. She had 1 formed BM QD. Last month she was more constipated - would go up to 2 days without a BM. She felt some abdominal tightness but attributed it to stress. She had labs done on Nov 16 with her OBGYN - iron normal. She last saw her OBGYN on Jan 11.  On 4/26/22, she said she had 6-weeks to go for the pregnancy. She continued on 6-MP 50 mg 1 pill QD, iron, and vit D. Labs done in Feb - recheck on May 4.  On 8/3/22, she said she delivered her son on July 2nd (no epidural, 8 lbs 7 oz). She was breastfeeding. She last took 6-MP on July 1st because she was instructed to d/c all meds after being discharged from the hospital (patient was unsure why). She did not have diarrhea, bleeding, or abdominal pain. She took iron during her pregnancy and continued on iron.  Recommended she resume 6-mp (dump breast milk after 4 hrs after taking 6-mp).    On 10/11/22, she said she had a recent flare - bleeding returned 10 days ago, cramping, and looser stools. She had not had blood in her stool for the past 3 days. Abdominal pain was improving. She last had a BM yesterday which was soft - no longer watery. She resumed her 6-MP on Oct 3. She d/c iron following her last lab results in Aug. She was no longer on vit D. Her son is 14 weeks old (currently using breast milk stored when not taking 6-mp) - planned to stop breastfeeding at 1 year old.  On 11/29/22, she said she had 0-1 BMs QD that were formed. No rectal bleeding she stated. No abdominal pain. She continued on 6-MP 50 mg 1 pill QD.  On 2/28/23, she said she continued on 6-MP 50 mg 1 pill QD - no diarrhea, bleeding, or pain. She took a prenatal vitamin (had iron in it) QOD.  On 6/13/23, the patient said she continued on 6-MP 50 mg 1 pill QD - no diarrhea, bleeding, or abdominal pain. Her PCP did not put her on vit D supplementation after her low vit D lab result. She was still on a prenatal vitamin.   On 8/9/23, the patient presents after recent stay at [facility] for bloody diarrhea/left sided abdominal pain.  Seen 7/18/23 by our consult team.  Stool study negative for an infection.  Eventually began prednisone up to 40 mg daily - been on 40 mg daily x 1 week.  Today, she said BMs down to 3-4/day and no blood x 5 days.  Left sided abdominal pain improving.  She noted daily nausea/vomiting since ED stay and ondansetron was not beneficial.  She also stated she has not been truthful in regard to taking 6-MP - last took Dec. 2022 when she had Covid.   She resumed it on 7/10/23.  On 8/23/23, she said she felt 75% better. She had 2-3 soft-serve BMs/day. No blood for the past 3 weeks. She had not tapered prednisone yet - currently on prednisone 10 mg 4 pills QD. She was cramping, sometimes associated it with coldness. Question if hyoscyamine helped. She had chills and night sweats. Not much of an appetite. She was drinking Ensure 2/day. Occasional dizziness, lightheadedness, and weakness. Ondansetron did not help, but promethazine did. Promethazine made her sleepy. She was on 6-MP 50 mg 2 pills QD. She had a lot of gas (belching and passage of gas).   On 9/15/23, she said she had tapered down to prednisone 25 mg QD last week. 2 days after tapering, she had a formed BM but recurrence of bleeding (on stool and tissue). She had another episode of bleeding 2 days ago, but that stool was looser. She was supposed to taper to 20 mg today, but decided not to due to her recent bleeding. Yesterday did not have a BM. She had some cramping which she took hyoscyamine for. She took promethazine most nights to help her sleep which worked.   On 10/4/23, she said she was now on prednisone 15 mg QD - been on it for 1 week. She had a daily, formed BM without blood. She was frequently urinating throughout the night. Urine was dark colored.   Her father had colon cancer over age 60 while paternal grandfather had colon cancer less than age 60.  On 11/8/23, she said she was off prednisone. She was on 6-MP 50 mg, now 2.5 pills QD. No diarrhea or rectal bleeding. Still had left sided abdominal pain, left back pain, and fatigue. Left sided abdominal pain was not as bad as before.  On 1/10/24, she said she had a daily BM without blood or constipation. No abdominal pain. She continued on 6-MP 50 mg 2 pills QD.   On 5/1/24, she said she was doing well. She had left-sided abdominal pain 1x/week and lasted up to half a day. A BM could sometimes relieve it. No diarrhea, constipation, or rectal bleeding. She continued on 6-MP 50 mg 2.5 pills QD. Recent labs done with Cely Randolph MD.  HPI Today, she says she is 23 weeks pregnant, due in Dec. Once she found out about her pregnancy in May, she decreased 6-MP 50 mg to 1 pill QD. She wants to start on Humira after delivery because she is concerned about compliance with 6-MP due to her issues with compliance with her first child.  Labs 5/1/24 - 6 , 6 MP 4008. 11/8/23 - 6-. 10/4/23 - 6-, 6-MMPN 2927. CMP normal except albumin 3.8. Iron 62, iron sat 25%, TIBC 250, UIBC 188, ferritin 80. UA, urine culture all negative. CBC normal. 8/23/23 - 6-, 6-MMPN 4400. CBC normal except hgb 10.3, platelets 588. CMV PCR negative. 8/9/23 - CBC normal except hgb 9.8, platelets 487. CMP normal except albumin 3.3. TIBC 204, iron 33, iron sat 16%, UIBC 171, ferritin 300, CRP 16. 7/18/23 - CBC normal except MCV 76. CMP normal except potassium 2.9. Lipase normal. 4/6/23 - CBC normal (hgb 14, platelets 260) except MCV 78.8. Iron 116, , iron sat 36%, ferritin 183, vit D 24. CMP, TSH all normal.    10/11/22 - 6-, 6-MMP 2021. Iron sat 47%, ferritin 247, iron 128%, TIBC 274. CMP normal except creatinine 1.07, ALT 35. CBC, Vit D all normal. 8/3/22 - CBC normal except hgb 16. CMP normal except AST 47, ALT 48. Iron sat 47%, ferritin 247, TIBC 274, iron 128. Vit D normal. 2/23/22 - 6-, 6-MMPN 2042. CMP normal except BUN 5. TIBC 324, UIBC 254, iron 70, iron sat 22%, ferritin 65. CBC normal. 4/28/21 - 6-, 6-MMPN 4912. Iron 9, hgb electrophoresis - hgb C trait (heterozygous), CMP, vit B12/folate all normal. 1/27/21 - 6-, 6-MMPN 04059. CBC normal except WBC 2.6, hgb 10. CMP normal except AST 43, . UA, urine culture all negative. Iron/TIBC, ferritin all normal. 12/2/20 - CBC normal except MCV 76. CMP normal creatinine 1.27. Iron 25, iron sat 7%, ferritin 12. TSH/FT4 normal.  4/1/20 - 6-, 6-MMPN 215. CMP normal except creatinine 1.38, GFR 57. CBC, iron/TIBC all normal. 2/3/20 - 6-, 6-MMPN 99539. CBC normal except platelets 486. CMP normal. 1/15/20 - 6-, 6-MMPN 26409.  12/11/19 - CMP normal except sodium 135, ALT 59. 12/10/19 - CBC normal except hgb 9.8, MCV 69.6, platelets 713. 12/4/19 - TPMP normal metabolizer. 12/2/19 - CBC normal except WBC 12.3, hgb 11, MCV 71.4, platelets 620. Sed rate 87, haptoglobin 399, INR 1.42, Iron 15, TIBC 214, iron sat 7%, CRP 38.80. Ferritin normal. 10/4/19 -Mg 1.7. CBC, alk phos all normal. CMP normal except creatinine 1.11.

## 2024-09-16 ENCOUNTER — OFFICE VISIT (OUTPATIENT)
Dept: URBAN - METROPOLITAN AREA SURGERY CENTER 16 | Facility: SURGERY CENTER | Age: 39
End: 2024-09-16

## 2025-01-29 ENCOUNTER — OFFICE VISIT (OUTPATIENT)
Dept: URBAN - METROPOLITAN AREA CLINIC 105 | Facility: CLINIC | Age: 40
End: 2025-01-29

## 2025-01-31 ENCOUNTER — OFFICE VISIT (OUTPATIENT)
Dept: URBAN - METROPOLITAN AREA CLINIC 105 | Facility: CLINIC | Age: 40
End: 2025-01-31

## 2025-02-26 ENCOUNTER — OFFICE VISIT (OUTPATIENT)
Dept: URBAN - METROPOLITAN AREA CLINIC 105 | Facility: CLINIC | Age: 40
End: 2025-02-26

## 2025-02-26 VITALS
SYSTOLIC BLOOD PRESSURE: 137 MMHG | BODY MASS INDEX: 30.21 KG/M2 | WEIGHT: 211 LBS | HEART RATE: 77 BPM | TEMPERATURE: 97.5 F | HEIGHT: 70 IN | DIASTOLIC BLOOD PRESSURE: 84 MMHG

## 2025-02-26 DIAGNOSIS — R63.4 UNINTENTIONAL WEIGHT LOSS: ICD-10-CM

## 2025-02-26 DIAGNOSIS — Z14.8 HEMOGLOBIN C VARIANT CARRIER: ICD-10-CM

## 2025-02-26 DIAGNOSIS — Z12.83 SKIN CANCER SCREENING: ICD-10-CM

## 2025-02-26 DIAGNOSIS — M54.5 LOWER BACK PAIN: ICD-10-CM

## 2025-02-26 DIAGNOSIS — D50.9 IRON DEFICIENCY ANEMIA: ICD-10-CM

## 2025-02-26 DIAGNOSIS — K50.90 CROHN'S DISEASE: ICD-10-CM

## 2025-02-26 DIAGNOSIS — D25.9 UTERINE LEIOMYOMA, UNSPECIFIED LOCATION: ICD-10-CM

## 2025-02-26 DIAGNOSIS — E55.9 VITAMIN D DEFICIENCY: ICD-10-CM

## 2025-02-26 DIAGNOSIS — R74.01 ELEVATED TRANSAMINASE LEVEL: ICD-10-CM

## 2025-02-26 PROCEDURE — 99214 OFFICE O/P EST MOD 30 MIN: CPT | Performed by: INTERNAL MEDICINE

## 2025-02-26 RX ORDER — MERCAPTOPURINE 50 MG/1
2.5 TABS TABLET ORAL DAILY
Qty: 225 TABLET | Refills: 1 | COMMUNITY
Start: 2020-12-02

## 2025-02-26 RX ORDER — FERROUS GLUCONATE 324 MG
1 TABLET WITH WATER OR JUICE BETWEEN MEALS TABLET ORAL ONCE A DAY
Qty: 90 | Refills: 1 | Status: ON HOLD | COMMUNITY
Start: 2020-12-03

## 2025-02-26 RX ORDER — MERCAPTOPURINE 50 MG/1
1 TAB TABLET ORAL DAILY
Qty: 90 TABLET | Refills: 1 | Status: ON HOLD | COMMUNITY
Start: 2020-12-02

## 2025-02-26 RX ORDER — ONDANSETRON HYDROCHLORIDE 4 MG/1
1-2 TABLETS TABLET, FILM COATED ORAL EVERY 6 HOURS
Qty: 45 | Refills: 2 | Status: ON HOLD | COMMUNITY
Start: 2023-08-09

## 2025-02-26 NOTE — HPI-TODAY'S VISIT:
The patient presents on follow-up for Crohn's disease.  PAST MEDICAL HISTORY:  On 12/20/19, the patient said that in November, she started reporting abdominal pain, nausea/vomiting, and bloody diarrhea. She went to the ER on 11/23. She was diagnosed with the flu and discharged with Zofran and a flu medication. Symptoms persisted. She presented to the ER again on 11/28 with the same symptoms in addition to increased leg/arm pain and decreased mobility. A flu test was not done at the first visit, so it was done this time. It turned out she did not have the flu. Fluid was found in her ear and she was given a diagnosis of an ear infection. A colonoscopy was done on 12/4/19 with Dr. Capps which noted Crohn's colitis.  She was discharged on prednisone 40 mg daily and told her it would decrease the swelling in her legs. She was in the ER again on 12/10-12/14. Her prednisone was increased to 60 mg daily. It was administered intravenously for the first 2 days of her visit and then orally the rest of her stay. She was also given pain medication and Zofran.  Dr. Stephens recommended starting Remicade which she refused as an inpatient.     On 1/15/20, she says she started on 6-MP 50 mg 2 pills QD on 1/2/20. She was on prednisone 20 mg QD and would taper down to 15 mg on Jan 19.  She now reported insomnia and didn't urinate as often. She had 1-2 formed BMs/day with no bleeding. Side pain improved. Nausea resolved. She was on oral iron 1 pill QD.  On 2/18/20, she said she was on her last day of prednisone 2.5 mg. She was taking 6-MP 50 mg 2 pills QD. Diarrhea resolved. Abdominal pain has mostly resolved. She stopped taking oral iron on the 15th.   On 4/1/20, she was doing well. She stopped prednisone at her last visit.  She continued on 6-mp 100 mg daily. She denied abdominal pain, diarrhea, or rectal bleeding.  On 12/2/20, she said she d/c 6-MP in July at the direction of her PCP (Dr. Aleksandra Bellamy) after presenting for a missed menstrual cycle. She again saw Dr. Bellamy in follow-up and was told to stay off 6-MP. Around this time she was having left-sided pain. An x-ray and MRI were done. She said the imaging found fibroids "pushing up against her kidney," which the patient was told was the cause of her pain. On Dec 18 she will have a uterine ablation. Her other two doctors, Dr. Haley (surgeon treating her fibroids) and Dr. Pinzon (OBGYN, also recommended she stay off 6-MP).  However, given she had been off treatment for Crohn's disease since July 2020 she presented with 4-6 loose BMs/day. She also reported rectal bleeding with each BM. Left-sided pain had persisted.  On 12/15/20, she said she started on prednisone (decreased to 35 mg QD on Sunday) and resumed 6-MP. Frequency decreased to 1 BM/day with formation. Bleeding had decreased. She continued to report intermittent cramping before a BM. She had an appetite, but was afraid to liberalize her diet.  She started on iron 1 pill QD.  On 1/27/21, she said she tapered off prednisone on Sunday. She reported onset of bilateral lower back pain in the last week in addition to fatigue. She continued on 6-MP 50 mg 2 pills QD. She was off iron. She had liberalized her diet more since last visit, but was still on a low-fiber diet.  On 4/28/21, she said she decreased to 6-MP 50 mg 1 pill QD. She was currently asymptomatic GI-wise. She had her uterine fibroid embolization on March 16 and had labs done 2 weeks prior to that. She had temporary constipation following her procedure. Lower back pain had resolved.  On 8/3/21, she reported onset of left-sided abdominal pain and lower back discomfort 3 weeks ago. As a result, she slept on a pillow  on both hips which provided some benefit. Having a BM also lessened discomfort. She had not seen a rheumatologist. 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 She was trying meditation in the morning to relieve stress and count down from 100 when stressed. She had not had a recurrence of diarrhea or rectal bleeding. She continued on 6-MP 50 mg 1 pill QD. She continued on oral iron which had decreased her BM frequency to QOD she stated. She had started on vit D supplementation 1x/week since early June.  On 11/9/21, she said she was 7-weeks pregnant. She continued on 6-MP 50 mg 1 pill QD. She had not had abdominal pain, rectal bleeding, or diarrhea. She had a BM/2-3 days that she attributed to her pregnancy - she drank prune juice to help her bowel habit. She continued on oral iron. She decreased vit D dosage to 1000 IU (prenatal vitamins also have vit D). Next bloodwork was on Tuesday.  On 2/23/22, she said she went to the ER on New Year[facility] due to left suprapubic pain - labs and MRI were done she stated. She was told her pain was due to a combination of stress, a "growing uterus," and fibroids. She was currently 25 weeks pregnant. She was not having diarrhea or rectal bleeding currently. She had 1 formed BM QD. Last month she was more constipated - would go up to 2 days without a BM. She felt some abdominal tightness but attributed it to stress. She had labs done on Nov 16 with her OBGYN - iron normal. She last saw her OBGYN on Jan 11.  On 4/26/22, she said she had 6-weeks to go for the pregnancy. She continued on 6-MP 50 mg 1 pill QD, iron, and vit D. Labs done in Feb - recheck on May 4.  On 8/3/22, she said she delivered her son on July 2nd (no epidural, 8 lbs 7 oz). She was breastfeeding. She last took 6-MP on July 1st because she was instructed to d/c all meds after being discharged from the hospital (patient was unsure why). She did not have diarrhea, bleeding, or abdominal pain. She took iron during her pregnancy and continued on iron.  Recommended she resume 6-mp (dump breast milk after 4 hrs after taking 6-mp).    On 10/11/22, she said she had a recent flare - bleeding returned 10 days ago, cramping, and looser stools. She had not had blood in her stool for the past 3 days. Abdominal pain was improving. She last had a BM yesterday which was soft - no longer watery. She resumed her 6-MP on Oct 3. She d/c iron following her last lab results in Aug. She was no longer on vit D. Her son is 14 weeks old (currently using breast milk stored when not taking 6-mp) - planned to stop breastfeeding at 1 year old.  On 11/29/22, she said she had 0-1 BMs QD that were formed. No rectal bleeding she stated. No abdominal pain. She continued on 6-MP 50 mg 1 pill QD.  On 2/28/23, she said she continued on 6-MP 50 mg 1 pill QD - no diarrhea, bleeding, or pain. She took a prenatal vitamin (had iron in it) QOD.  On 6/13/23, the patient said she continued on 6-MP 50 mg 1 pill QD - no diarrhea, bleeding, or abdominal pain. Her PCP did not put her on vit D supplementation after her low vit D lab result. She was still on a prenatal vitamin.   On 8/9/23, the patient presents after recent stay at [facility] for bloody diarrhea/left sided abdominal pain.  Seen 7/18/23 by our consult team.  Stool study negative for an infection.  Eventually began prednisone up to 40 mg daily - been on 40 mg daily x 1 week.  Today, she said BMs down to 3-4/day and no blood x 5 days.  Left sided abdominal pain improving.  She noted daily nausea/vomiting since ED stay and ondansetron was not beneficial.  She also stated she has not been truthful in regard to taking 6-MP - last took Dec. 2022 when she had Covid.   She resumed it on 7/10/23.  On 8/23/23, she said she felt 75% better. She had 2-3 soft-serve BMs/day. No blood for the past 3 weeks. She had not tapered prednisone yet - currently on prednisone 10 mg 4 pills QD. She was cramping, sometimes associated it with coldness. Question if hyoscyamine helped. She had chills and night sweats. Not much of an appetite. She was drinking Ensure 2/day. Occasional dizziness, lightheadedness, and weakness. Ondansetron did not help, but promethazine did. Promethazine made her sleepy. She was on 6-MP 50 mg 2 pills QD. She had a lot of gas (belching and passage of gas).   On 9/15/23, she said she had tapered down to prednisone 25 mg QD last week. 2 days after tapering, she had a formed BM but recurrence of bleeding (on stool and tissue). She had another episode of bleeding 2 days ago, but that stool was looser. She was supposed to taper to 20 mg today, but decided not to due to her recent bleeding. Yesterday did not have a BM. She had some cramping which she took hyoscyamine for. She took promethazine most nights to help her sleep which worked.   On 10/4/23, she said she was now on prednisone 15 mg QD - been on it for 1 week. She had a daily, formed BM without blood. She was frequently urinating throughout the night. Urine was dark colored.   Her father had colon cancer over age 60 while paternal grandfather had colon cancer less than age 60.  On 11/8/23, she said she was off prednisone. She was on 6-MP 50 mg, now 2.5 pills QD. No diarrhea or rectal bleeding. Still had left sided abdominal pain, left back pain, and fatigue. Left sided abdominal pain was not as bad as before.  On 1/10/24, she said she had a daily BM without blood or constipation. No abdominal pain. She continued on 6-MP 50 mg 2 pills QD.   On 5/1/24, she said she was doing well. She had left-sided abdominal pain 1x/week and lasted up to half a day. A BM could sometimes relieve it. No diarrhea, constipation, or rectal bleeding. She continued on 6-MP 50 mg 2.5 pills QD. Recent labs done with Cely Randolph MD.  On 8/28/24, she said she was 23 weeks pregnant, due in Dec. Once she found out about her pregnancy in May, she decreased 6-MP 50 mg to 1 pill QD. She wanted to start on Humira after delivery because she was concerned about compliance with 6-MP due to her issues with compliance with her first child.  HPI Today, she says her new baby will be 7 weeks old in 4 days. She's been off 6-MP for 7 weeks. No diarrhea, rectal bleeding, or abdominal pain.  Labs 8/28/24 - 6 TG <50, 6 MMP 1036. Iron 148, iron sat 43%, TIBC 346, ferritin 29. CBC, CMP all normal. 5/1/24 - 6 , 6 MP 4008. 11/8/23 - 6-. 10/4/23 - 6-, 6-MMPN 2927. CMP normal except albumin 3.8. Iron 62, iron sat 25%, TIBC 250, UIBC 188, ferritin 80. UA, urine culture all negative. CBC normal. 8/23/23 - 6-, 6-MMPN 4400. CBC normal except hgb 10.3, platelets 588. CMV PCR negative. 8/9/23 - CBC normal except hgb 9.8, platelets 487. CMP normal except albumin 3.3. TIBC 204, iron 33, iron sat 16%, UIBC 171, ferritin 300, CRP 16. 7/18/23 - CBC normal except MCV 76. CMP normal except potassium 2.9. Lipase normal. 4/6/23 - CBC normal (hgb 14, platelets 260) except MCV 78.8. Iron 116, , iron sat 36%, ferritin 183, vit D 24. CMP, TSH all normal.    10/11/22 - 6-, 6-MMP 2021. Iron sat 47%, ferritin 247, iron 128%, TIBC 274. CMP normal except creatinine 1.07, ALT 35. CBC, Vit D all normal. 8/3/22 - CBC normal except hgb 16. CMP normal except AST 47, ALT 48. Iron sat 47%, ferritin 247, TIBC 274, iron 128. Vit D normal. 2/23/22 - 6-, 6-MMPN 2042. CMP normal except BUN 5. TIBC 324, UIBC 254, iron 70, iron sat 22%, ferritin 65. CBC normal. 4/28/21 - 6-, 6-MMPN 4912. Iron 9, hgb electrophoresis - hgb C trait (heterozygous), CMP, vit B12/folate all normal. 1/27/21 - 6-, 6-MMPN 74858. CBC normal except WBC 2.6, hgb 10. CMP normal except AST 43, . UA, urine culture all negative. Iron/TIBC, ferritin all normal. 12/2/20 - CBC normal except MCV 76. CMP normal creatinine 1.27. Iron 25, iron sat 7%, ferritin 12. TSH/FT4 normal.  4/1/20 - 6-, 6-MMPN 215. CMP normal except creatinine 1.38, GFR 57. CBC, iron/TIBC all normal. 2/3/20 - 6-, 6-MMPN 07540. CBC normal except platelets 486. CMP normal. 1/15/20 - 6-, 6-MMPN 40282.  12/11/19 - CMP normal except sodium 135, ALT 59. 12/10/19 - CBC normal except hgb 9.8, MCV 69.6, platelets 713. 12/4/19 - TPMP normal metabolizer. 12/2/19 - CBC normal except WBC 12.3, hgb 11, MCV 71.4, platelets 620. Sed rate 87, haptoglobin 399, INR 1.42, Iron 15, TIBC 214, iron sat 7%, CRP 38.80. Ferritin normal. 10/4/19 -Mg 1.7. CBC, alk phos all normal. CMP normal except creatinine 1.11.

## 2025-03-10 ENCOUNTER — P2P PATIENT RECORD (OUTPATIENT)
Age: 40
End: 2025-03-10

## 2025-03-20 ENCOUNTER — P2P PATIENT RECORD (OUTPATIENT)
Age: 40
End: 2025-03-20

## 2025-03-24 ENCOUNTER — P2P PATIENT RECORD (OUTPATIENT)
Age: 40
End: 2025-03-24

## 2025-03-26 ENCOUNTER — OFFICE VISIT (OUTPATIENT)
Dept: URBAN - METROPOLITAN AREA CLINIC 105 | Facility: CLINIC | Age: 40
End: 2025-03-26

## 2025-03-26 RX ORDER — FERROUS GLUCONATE 324 MG
1 TABLET WITH WATER OR JUICE BETWEEN MEALS TABLET ORAL ONCE A DAY
Qty: 90 | Refills: 1 | Status: ON HOLD | COMMUNITY
Start: 2020-12-03

## 2025-03-26 RX ORDER — MERCAPTOPURINE 50 MG/1
1 TAB TABLET ORAL DAILY
Qty: 90 TABLET | Refills: 1 | Status: ON HOLD | COMMUNITY
Start: 2020-12-02

## 2025-03-26 RX ORDER — ONDANSETRON HYDROCHLORIDE 4 MG/1
1-2 TABLETS TABLET, FILM COATED ORAL EVERY 6 HOURS
Qty: 45 | Refills: 2 | Status: ON HOLD | COMMUNITY
Start: 2023-08-09

## 2025-03-26 NOTE — HPI-TODAY'S VISIT:
The patient presents on follow-up for Crohn's disease.  PAST MEDICAL HISTORY:  On 12/20/19, the patient said that in November, she started reporting abdominal pain, nausea/vomiting, and bloody diarrhea. She went to the ER on 11/23. She was diagnosed with the flu and discharged with Zofran and a flu medication. Symptoms persisted. She presented to the ER again on 11/28 with the same symptoms in addition to increased leg/arm pain and decreased mobility. A flu test was not done at the first visit, so it was done this time. It turned out she did not have the flu. Fluid was found in her ear and she was given a diagnosis of an ear infection. A colonoscopy was done on 12/4/19 with Dr. Capps which noted Crohn's colitis.  She was discharged on prednisone 40 mg daily and told her it would decrease the swelling in her legs. She was in the ER again on 12/10-12/14. Her prednisone was increased to 60 mg daily. It was administered intravenously for the first 2 days of her visit and then orally the rest of her stay. She was also given pain medication and Zofran.  Dr. Stephens recommended starting Remicade which she refused as an inpatient.     On 1/15/20, she says she started on 6-MP 50 mg 2 pills QD on 1/2/20. She was on prednisone 20 mg QD and would taper down to 15 mg on Jan 19.  She now reported insomnia and didn't urinate as often. She had 1-2 formed BMs/day with no bleeding. Side pain improved. Nausea resolved. She was on oral iron 1 pill QD.  On 2/18/20, she said she was on her last day of prednisone 2.5 mg. She was taking 6-MP 50 mg 2 pills QD. Diarrhea resolved. Abdominal pain has mostly resolved. She stopped taking oral iron on the 15th.   On 4/1/20, she was doing well. She stopped prednisone at her last visit.  She continued on 6-mp 100 mg daily. She denied abdominal pain, diarrhea, or rectal bleeding.  On 12/2/20, she said she d/c 6-MP in July at the direction of her PCP (Dr. Aleksandra Bellamy) after presenting for a missed menstrual cycle. She again saw Dr. Bellamy in follow-up and was told to stay off 6-MP. Around this time she was having left-sided pain. An x-ray and MRI were done. She said the imaging found fibroids "pushing up against her kidney," which the patient was told was the cause of her pain. On Dec 18 she will have a uterine ablation. Her other two doctors, Dr. Haley (surgeon treating her fibroids) and Dr. Pinzon (OBGYN, also recommended she stay off 6-MP).  However, given she had been off treatment for Crohn's disease since July 2020 she presented with 4-6 loose BMs/day. She also reported rectal bleeding with each BM. Left-sided pain had persisted.  On 12/15/20, she said she started on prednisone (decreased to 35 mg QD on Sunday) and resumed 6-MP. Frequency decreased to 1 BM/day with formation. Bleeding had decreased. She continued to report intermittent cramping before a BM. She had an appetite, but was afraid to liberalize her diet.  She started on iron 1 pill QD.  On 1/27/21, she said she tapered off prednisone on Sunday. She reported onset of bilateral lower back pain in the last week in addition to fatigue. She continued on 6-MP 50 mg 2 pills QD. She was off iron. She had liberalized her diet more since last visit, but was still on a low-fiber diet.  On 4/28/21, she said she decreased to 6-MP 50 mg 1 pill QD. She was currently asymptomatic GI-wise. She had her uterine fibroid embolization on March 16 and had labs done 2 weeks prior to that. She had temporary constipation following her procedure. Lower back pain had resolved.  On 8/3/21, she reported onset of left-sided abdominal pain and lower back discomfort 3 weeks ago. As a result, she slept on a pillow  on both hips which provided some benefit. Having a BM also lessened discomfort. She had not seen a rheumatologist. 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 She was trying meditation in the morning to relieve stress and count down from 100 when stressed. She had not had a recurrence of diarrhea or rectal bleeding. She continued on 6-MP 50 mg 1 pill QD. She continued on oral iron which had decreased her BM frequency to QOD she stated. She had started on vit D supplementation 1x/week since early June.  On 11/9/21, she said she was 7-weeks pregnant. She continued on 6-MP 50 mg 1 pill QD. She had not had abdominal pain, rectal bleeding, or diarrhea. She had a BM/2-3 days that she attributed to her pregnancy - she drank prune juice to help her bowel habit. She continued on oral iron. She decreased vit D dosage to 1000 IU (prenatal vitamins also have vit D). Next bloodwork was on Tuesday.  On 2/23/22, she said she went to the ER on [facility] Year[facility] due to left suprapubic pain - labs and MRI were done she stated. She was told her pain was due to a combination of stress, a "growing uterus," and fibroids. She was currently 25 weeks pregnant. She was not having diarrhea or rectal bleeding currently. She had 1 formed BM QD. Last month she was more constipated - would go up to 2 days without a BM. She felt some abdominal tightness but attributed it to stress. She had labs done on Nov 16 with her OBGYN - iron normal. She last saw her OBGYN on Jan 11.  On 4/26/22, she said she had 6-weeks to go for the pregnancy. She continued on 6-MP 50 mg 1 pill QD, iron, and vit D. Labs done in Feb - recheck on May 4.  On 8/3/22, she said she delivered her son on July 2nd (no epidural, 8 lbs 7 oz). She was breastfeeding. She last took 6-MP on July 1st because she was instructed to d/c all meds after being discharged from the hospital (patient was unsure why). She did not have diarrhea, bleeding, or abdominal pain. She took iron during her pregnancy and continued on iron.  Recommended she resume 6-mp (dump breast milk after 4 hrs after taking 6-mp).    On 10/11/22, she said she had a recent flare - bleeding returned 10 days ago, cramping, and looser stools. She had not had blood in her stool for the past 3 days. Abdominal pain was improving. She last had a BM yesterday which was soft - no longer watery. She resumed her 6-MP on Oct 3. She d/c iron following her last lab results in Aug. She was no longer on vit D. Her son is 14 weeks old (currently using breast milk stored when not taking 6-mp) - planned to stop breastfeeding at 1 year old.  On 11/29/22, she said she had 0-1 BMs QD that were formed. No rectal bleeding she stated. No abdominal pain. She continued on 6-MP 50 mg 1 pill QD.  On 2/28/23, she said she continued on 6-MP 50 mg 1 pill QD - no diarrhea, bleeding, or pain. She took a prenatal vitamin (had iron in it) QOD.  On 6/13/23, the patient said she continued on 6-MP 50 mg 1 pill QD - no diarrhea, bleeding, or abdominal pain. Her PCP did not put her on vit D supplementation after her low vit D lab result. She was still on a prenatal vitamin.   On 8/9/23, the patient presents after recent stay at [place] [facility] for bloody diarrhea/left sided abdominal pain.  Seen 7/18/23 by our consult team.  Stool study negative for an infection.  Eventually began prednisone up to 40 mg daily - been on 40 mg daily x 1 week.  Today, she said BMs down to 3-4/day and no blood x 5 days.  Left sided abdominal pain improving.  She noted daily nausea/vomiting since ED stay and ondansetron was not beneficial.  She also stated she has not been truthful in regard to taking 6-MP - last took Dec. 2022 when she had Covid.   She resumed it on 7/10/23.  On 8/23/23, she said she felt 75% better. She had 2-3 soft-serve BMs/day. No blood for the past 3 weeks. She had not tapered prednisone yet - currently on prednisone 10 mg 4 pills QD. She was cramping, sometimes associated it with coldness. Question if hyoscyamine helped. She had chills and night sweats. Not much of an appetite. She was drinking Ensure 2/day. Occasional dizziness, lightheadedness, and weakness. Ondansetron did not help, but promethazine did. Promethazine made her sleepy. She was on 6-MP 50 mg 2 pills QD. She had a lot of gas (belching and passage of gas).   On 9/15/23, she said she had tapered down to prednisone 25 mg QD last week. 2 days after tapering, she had a formed BM but recurrence of bleeding (on stool and tissue). She had another episode of bleeding 2 days ago, but that stool was looser. She was supposed to taper to 20 mg today, but decided not to due to her recent bleeding. Yesterday did not have a BM. She had some cramping which she took hyoscyamine for. She took promethazine most nights to help her sleep which worked.   On 10/4/23, she said she was now on prednisone 15 mg QD - been on it for 1 week. She had a daily, formed BM without blood. She was frequently urinating throughout the night. Urine was dark colored.   Her father had colon cancer over age 60 while paternal grandfather had colon cancer less than age 60.  On 11/8/23, she said she was off prednisone. She was on 6-MP 50 mg, now 2.5 pills QD. No diarrhea or rectal bleeding. Still had left sided abdominal pain, left back pain, and fatigue. Left sided abdominal pain was not as bad as before.  On 1/10/24, she said she had a daily BM without blood or constipation. No abdominal pain. She continued on 6-MP 50 mg 2 pills QD.   On 5/1/24, she said she was doing well. She had left-sided abdominal pain 1x/week and lasted up to half a day. A BM could sometimes relieve it. No diarrhea, constipation, or rectal bleeding. She continued on 6-MP 50 mg 2.5 pills QD. Recent labs done with Cely Randolph MD.  On 8/28/24, she said she was 23 weeks pregnant, due in Dec. Once she found out about her pregnancy in May, she decreased 6-MP 50 mg to 1 pill QD. She wanted to start on Humira after delivery because she was concerned about compliance with 6-MP due to her issues with compliance with her first child.  On 2/26/25, she said her new baby would be 7 weeks old in 4 days. She had been off 6-MP for 7 weeks. No diarrhea, rectal bleeding, or abdominal pain.  Labs 2/26/25 - CMP normal except creatinine 1.03. Iron 121, iron sat 45%, TIBC 271, ferritin 174. Vit D 23. HBsAg, hep c ab, hep b core ab total, HIV, Quant-TB all negative. CBC normal. 8/28/24 - 6 TG <50, 6 MMP 1036. Iron 148, iron sat 43%, TIBC 346, ferritin 29. CBC, CMP all normal. 5/1/24 - 6 , 6 MP 4008. 11/8/23 - 6-. 10/4/23 - 6-, 6-MMPN 2927. CMP normal except albumin 3.8. Iron 62, iron sat 25%, TIBC 250, UIBC 188, ferritin 80. UA, urine culture all negative. CBC normal. 8/23/23 - 6-, 6-MMPN 4400. CBC normal except hgb 10.3, platelets 588. CMV PCR negative. 8/9/23 - CBC normal except hgb 9.8, platelets 487. CMP normal except albumin 3.3. TIBC 204, iron 33, iron sat 16%, UIBC 171, ferritin 300, CRP 16. 7/18/23 - CBC normal except MCV 76. CMP normal except potassium 2.9. Lipase normal. 4/6/23 - CBC normal (hgb 14, platelets 260) except MCV 78.8. Iron 116, , iron sat 36%, ferritin 183, vit D 24. CMP, TSH all normal.    10/11/22 - 6-, 6-MMP 2021. Iron sat 47%, ferritin 247, iron 128%, TIBC 274. CMP normal except creatinine 1.07, ALT 35. CBC, Vit D all normal. 8/3/22 - CBC normal except hgb 16. CMP normal except AST 47, ALT 48. Iron sat 47%, ferritin 247, TIBC 274, iron 128. Vit D normal. 2/23/22 - 6-, 6-MMPN 2042. CMP normal except BUN 5. TIBC 324, UIBC 254, iron 70, iron sat 22%, ferritin 65. CBC normal. 4/28/21 - 6-, 6-MMPN 4912. Iron 9, hgb electrophoresis - hgb C trait (heterozygous), CMP, vit B12/folate all normal. 1/27/21 - 6-, 6-MMPN 74730. CBC normal except WBC 2.6, hgb 10. CMP normal except AST 43, . UA, urine culture all negative. Iron/TIBC, ferritin all normal. 12/2/20 - CBC normal except MCV 76. CMP normal creatinine 1.27. Iron 25, iron sat 7%, ferritin 12. TSH/FT4 normal.  4/1/20 - 6-, 6-MMPN 215. CMP normal except creatinine 1.38, GFR 57. CBC, iron/TIBC all normal. 2/3/20 - 6-, 6-MMPN 72146. CBC normal except platelets 486. CMP normal. 1/15/20 - 6-, 6-MMPN 20150.  12/11/19 - CMP normal except sodium 135, ALT 59. 12/10/19 - CBC normal except hgb 9.8, MCV 69.6, platelets 713. 12/4/19 - TPMP normal metabolizer. 12/2/19 - CBC normal except WBC 12.3, hgb 11, MCV 71.4, platelets 620. Sed rate 87, haptoglobin 399, INR 1.42, Iron 15, TIBC 214, iron sat 7%, CRP 38.80. Ferritin normal. 10/4/19 -Mg 1.7. CBC, alk phos all normal. CMP normal except creatinine 1.11.

## 2025-04-07 ENCOUNTER — WEB ENCOUNTER (OUTPATIENT)
Dept: URBAN - METROPOLITAN AREA CLINIC 105 | Facility: CLINIC | Age: 40
End: 2025-04-07

## 2025-04-08 ENCOUNTER — TELEPHONE ENCOUNTER (OUTPATIENT)
Dept: URBAN - METROPOLITAN AREA CLINIC 105 | Facility: CLINIC | Age: 40
End: 2025-04-08

## 2025-04-11 ENCOUNTER — OFFICE VISIT (OUTPATIENT)
Dept: URBAN - METROPOLITAN AREA CLINIC 105 | Facility: CLINIC | Age: 40
End: 2025-04-11

## 2025-04-21 ENCOUNTER — TELEPHONE ENCOUNTER (OUTPATIENT)
Dept: URBAN - METROPOLITAN AREA CLINIC 105 | Facility: CLINIC | Age: 40
End: 2025-04-21

## 2025-05-28 ENCOUNTER — P2P PATIENT RECORD (OUTPATIENT)
Age: 40
End: 2025-05-28

## 2025-07-26 ENCOUNTER — WEB ENCOUNTER (OUTPATIENT)
Dept: URBAN - METROPOLITAN AREA CLINIC 105 | Facility: CLINIC | Age: 40
End: 2025-07-26

## 2025-07-30 ENCOUNTER — WEB ENCOUNTER (OUTPATIENT)
Dept: URBAN - METROPOLITAN AREA CLINIC 105 | Facility: CLINIC | Age: 40
End: 2025-07-30